# Patient Record
Sex: MALE | Employment: UNEMPLOYED | URBAN - METROPOLITAN AREA
[De-identification: names, ages, dates, MRNs, and addresses within clinical notes are randomized per-mention and may not be internally consistent; named-entity substitution may affect disease eponyms.]

---

## 2017-10-25 ENCOUNTER — ANESTHESIA EVENT (OUTPATIENT)
Dept: ENDOSCOPY | Age: 62
End: 2017-10-25
Payer: MEDICARE

## 2017-10-25 RX ORDER — HALOPERIDOL 0.5 MG/1
TABLET ORAL
Status: ON HOLD | COMMUNITY
Start: 2017-09-28 | End: 2019-05-29 | Stop reason: DRUGHIGH

## 2017-10-25 RX ORDER — LORAZEPAM 0.5 MG/1
0.5 TABLET ORAL
Status: ON HOLD | COMMUNITY
Start: 2017-09-28 | End: 2019-05-29

## 2017-10-25 RX ORDER — LANOLIN ALCOHOL/MO/W.PET/CERES
6 CREAM (GRAM) TOPICAL
COMMUNITY
Start: 2017-08-24

## 2017-10-25 RX ORDER — ACETAMINOPHEN 325 MG/1
325 TABLET ORAL
COMMUNITY
Start: 2017-08-02 | End: 2019-06-05 | Stop reason: ALTCHOICE

## 2017-10-25 RX ORDER — TROLAMINE SALICYLATE 10 G/100G
CREAM TOPICAL 3 TIMES DAILY
COMMUNITY
Start: 2017-10-20 | End: 2019-05-28

## 2017-10-25 RX ORDER — MELATONIN
1000 DAILY
COMMUNITY
Start: 2017-08-02

## 2017-10-26 ENCOUNTER — HOSPITAL ENCOUNTER (OUTPATIENT)
Age: 62
Setting detail: OUTPATIENT SURGERY
Discharge: HOME | End: 2017-10-26
Attending: INTERNAL MEDICINE | Admitting: INTERNAL MEDICINE
Payer: MEDICARE

## 2017-10-26 ENCOUNTER — ANESTHESIA (OUTPATIENT)
Dept: ENDOSCOPY | Age: 62
End: 2017-10-26
Payer: MEDICARE

## 2017-10-26 VITALS
WEIGHT: 136 LBS | HEIGHT: 68 IN | OXYGEN SATURATION: 99 % | BODY MASS INDEX: 20.61 KG/M2 | HEART RATE: 72 BPM | RESPIRATION RATE: 16 BRPM | DIASTOLIC BLOOD PRESSURE: 72 MMHG | TEMPERATURE: 97 F | SYSTOLIC BLOOD PRESSURE: 124 MMHG

## 2017-10-26 PROBLEM — K21.9 GASTROESOPHAGEAL REFLUX DISEASE WITHOUT ESOPHAGITIS: Chronic | Status: ACTIVE | Noted: 2017-10-26

## 2017-10-26 PROCEDURE — 74011000250 HC RX REV CODE- 250

## 2017-10-26 PROCEDURE — 76040000019: Performed by: INTERNAL MEDICINE

## 2017-10-26 PROCEDURE — 77030009426 HC FCPS BIOP ENDOSC BSC -B: Performed by: INTERNAL MEDICINE

## 2017-10-26 PROCEDURE — 77030031670 HC DEV INFL ENDOTEK BIG60 MRTM -B: Performed by: INTERNAL MEDICINE

## 2017-10-26 PROCEDURE — 88305 TISSUE EXAM BY PATHOLOGIST: CPT | Performed by: INTERNAL MEDICINE

## 2017-10-26 PROCEDURE — 74011250636 HC RX REV CODE- 250/636: Performed by: NURSE ANESTHETIST, CERTIFIED REGISTERED

## 2017-10-26 PROCEDURE — 74011250636 HC RX REV CODE- 250/636

## 2017-10-26 PROCEDURE — 76060000031 HC ANESTHESIA FIRST 0.5 HR: Performed by: INTERNAL MEDICINE

## 2017-10-26 RX ORDER — SODIUM CHLORIDE 9 MG/ML
25 INJECTION, SOLUTION INTRAVENOUS CONTINUOUS
Status: DISCONTINUED | OUTPATIENT
Start: 2017-10-26 | End: 2017-10-26 | Stop reason: HOSPADM

## 2017-10-26 RX ORDER — SODIUM CHLORIDE 0.9 % (FLUSH) 0.9 %
5-10 SYRINGE (ML) INJECTION EVERY 8 HOURS
Status: DISCONTINUED | OUTPATIENT
Start: 2017-10-26 | End: 2017-10-26 | Stop reason: HOSPADM

## 2017-10-26 RX ORDER — SODIUM CHLORIDE 0.9 % (FLUSH) 0.9 %
5-10 SYRINGE (ML) INJECTION AS NEEDED
Status: DISCONTINUED | OUTPATIENT
Start: 2017-10-26 | End: 2017-10-26 | Stop reason: HOSPADM

## 2017-10-26 RX ORDER — LIDOCAINE HYDROCHLORIDE 20 MG/ML
INJECTION, SOLUTION EPIDURAL; INFILTRATION; INTRACAUDAL; PERINEURAL AS NEEDED
Status: DISCONTINUED | OUTPATIENT
Start: 2017-10-26 | End: 2017-10-26 | Stop reason: HOSPADM

## 2017-10-26 RX ORDER — FAMOTIDINE 20 MG/1
20 TABLET, FILM COATED ORAL ONCE
Status: DISCONTINUED | OUTPATIENT
Start: 2017-10-26 | End: 2017-10-26 | Stop reason: HOSPADM

## 2017-10-26 RX ORDER — SODIUM CHLORIDE, SODIUM LACTATE, POTASSIUM CHLORIDE, CALCIUM CHLORIDE 600; 310; 30; 20 MG/100ML; MG/100ML; MG/100ML; MG/100ML
50 INJECTION, SOLUTION INTRAVENOUS CONTINUOUS
Status: DISCONTINUED | OUTPATIENT
Start: 2017-10-26 | End: 2017-10-26 | Stop reason: HOSPADM

## 2017-10-26 RX ORDER — PROPOFOL 10 MG/ML
INJECTION, EMULSION INTRAVENOUS AS NEEDED
Status: DISCONTINUED | OUTPATIENT
Start: 2017-10-26 | End: 2017-10-26 | Stop reason: HOSPADM

## 2017-10-26 RX ORDER — LIDOCAINE HYDROCHLORIDE 10 MG/ML
0.1 INJECTION, SOLUTION EPIDURAL; INFILTRATION; INTRACAUDAL; PERINEURAL AS NEEDED
Status: DISCONTINUED | OUTPATIENT
Start: 2017-10-26 | End: 2017-10-26 | Stop reason: HOSPADM

## 2017-10-26 RX ORDER — ONDANSETRON 2 MG/ML
4 INJECTION INTRAMUSCULAR; INTRAVENOUS
Status: DISCONTINUED | OUTPATIENT
Start: 2017-10-26 | End: 2017-10-26 | Stop reason: HOSPADM

## 2017-10-26 RX ORDER — SODIUM CHLORIDE, SODIUM LACTATE, POTASSIUM CHLORIDE, CALCIUM CHLORIDE 600; 310; 30; 20 MG/100ML; MG/100ML; MG/100ML; MG/100ML
25 INJECTION, SOLUTION INTRAVENOUS CONTINUOUS
Status: DISCONTINUED | OUTPATIENT
Start: 2017-10-26 | End: 2017-10-26 | Stop reason: HOSPADM

## 2017-10-26 RX ADMIN — PROPOFOL 50 MG: 10 INJECTION, EMULSION INTRAVENOUS at 09:54

## 2017-10-26 RX ADMIN — PROPOFOL 30 MG: 10 INJECTION, EMULSION INTRAVENOUS at 09:56

## 2017-10-26 RX ADMIN — SODIUM CHLORIDE, SODIUM LACTATE, POTASSIUM CHLORIDE, AND CALCIUM CHLORIDE 25 ML/HR: 600; 310; 30; 20 INJECTION, SOLUTION INTRAVENOUS at 09:13

## 2017-10-26 RX ADMIN — LIDOCAINE HYDROCHLORIDE 40 MG: 20 INJECTION, SOLUTION EPIDURAL; INFILTRATION; INTRACAUDAL; PERINEURAL at 09:53

## 2017-10-26 NOTE — PROCEDURES
EGD Procedure Note    Patient: Reg Montenegro MRN: [id number]  SSN: [id number]    YOB: 1955  Age: 60 y.o. Sex: male      Date of Procedure: 10/26/2017      Procedures:  EGD :    HISTORY UPDATE: History and physical has been reviewed. The patient has been examined. There have been no significant clinical changes since the completion of the originally dated History and Physical.    INDICATION:  GERD    PROCEDURE PERFORMED: EGD    ENDOSCOPIST: Jose Mathews MD    ASSISTANT:  Endoscopy Technician-1: Paty Cardoza  Endoscopy RN-1: Carlos Vargas RN    CLASSIFICATION OF PREOPERATIVE RISK: ASA 2 - Patient with mild systemic disease with no functional limitations    ANESTHESIA:  MAC anesthesia    ENDOSCOPE: [id number]                                                                                                              EXTENT OF EXAM: Second portion of the duodenum      DESCRIPTION OF PROCEDURE:   The procedure was discussed with the patient including purpose, risks, benefits and alternatives including but not limited to IV conscious sedation, bleeding, perforation and aspiration and the consent form was signed and witnessed. A safety timeout was performed. Procedure done with MAC. The patients vital signs were monitored at all times including heart rate and rhythm, oxygen saturation, and blood pressure. The patient was then placed into the left lateral decubitus position. The Olympus adult diagnostic endoscope was then passed under direct visualization to the second portion of the duodenum. The endoscope was then slowly withdrawn while closely visualizing the mucosa. In the stomach a retroflexion was performed and gastric fundus and cardia visualized. The scope was then removed. The patient was then transferred to the recovery room. FINDINGS:   Esophagus: The esophageal mucosa was normal with no ulceration, mass or stricture.   There was no evidence of Acevedo's esophagus or reflux esophagitis. A schatzki's ring seen at the GE junction at 38 cm, dilated with 18-20 mm TTS balloon. A 2 cm Hiatal hernia seen from 38 to 40 cm. Stomach: The gastric mucosa was showed no ulceration, mass, stricture. Mild gastritis in the antrum, biopsies done to r/o H. Pylori. Retroflexion revealed a normal cardia and fundus      Duodenum: The duodenum mucosa was normal with no ulceration, mass,  stricture and no evidence of villous atrophy. EBL: None      SPECIMENS:   ID Type Source Tests Collected by Time Destination   1 : bx gastric for hpylori Preservative Gastric  Malika Shaw MD 10/26/2017 1000 Pathology       IMPRESSION: A schatzki's ring seen at the GE junction at 38 cm, dilated with 18-20 mm TTS balloon. A 2 cm Hiatal hernia seen from 38 to 40 cm. PLAN 1: Discharge when sedation criteria are met. 2.  Resume regular Diet as tolerated. 3. Follow up on the biopsy results     Follow Up:  As scheduled.      Kathe Luna MD  10/26/2017

## 2017-10-26 NOTE — H&P
Date of Surgery Update:  Gerhardt Hard was seen and examined. History and physical has been reviewed. The patient has been examined.  There have been no significant clinical changes since the completion of the originally dated History and Physical.    Signed By: Torri Perales MD     October 26, 2017 9:29 AM

## 2017-10-26 NOTE — ANESTHESIA PREPROCEDURE EVALUATION
Anesthetic History   No history of anesthetic complications            Review of Systems / Medical History  Patient summary reviewed and pertinent labs reviewed    Pulmonary  Within defined limits                 Neuro/Psych         Dementia     Cardiovascular  Within defined limits                Exercise tolerance: >4 METS     GI/Hepatic/Renal     GERD: poorly controlled      PUD     Endo/Other  Within defined limits           Other Findings   Comments:   Risk Factors for Postoperative nausea/vomiting:       History of postoperative nausea/vomiting? NO       Female? NO       Motion sickness? NO       Intended opioid administration for postoperative analgesia? NO      Smoking Abstinence  Current Smoker? NO  Elective Surgery? YES  Seen preoperatively by anesthesiologist or proxy prior to day of surgery? YES  Pt abstained from smoking 24 hours prior to anesthesia?  N/A           Physical Exam    Airway  Mallampati: II  TM Distance: 4 - 6 cm  Neck ROM: normal range of motion   Mouth opening: Normal     Cardiovascular  Regular rate and rhythm,  S1 and S2 normal,  no murmur, click, rub, or gallop  Rhythm: regular  Rate: normal         Dental    Dentition: Full lower dentures and Full upper dentures     Pulmonary  Breath sounds clear to auscultation               Abdominal  GI exam deferred       Other Findings            Anesthetic Plan    ASA: 3  Anesthesia type: MAC          Induction: Intravenous  Anesthetic plan and risks discussed with: Patient and Son / Daughter

## 2017-10-26 NOTE — IP AVS SNAPSHOT
[facility] 
 
 
 [address] 
[phone] Patient: Eric Younger MRN: [id number] GDU:[id number]/30/[id number] About your hospitalization You were admitted on:  October 26, 2017 You last received care in the:  [facility] PHASE 2 RECOVERY You were discharged on:  October 26, 2017 Why you were hospitalized Your primary diagnosis was:  Not on File Your diagnoses also included:  Gastroesophageal Reflux Disease Without Esophagitis Things You Need To Do (next 8 weeks) Follow up with Bari Roldan MD in 6 week(s) Phone:  [phone] Where:  [address], [facility] [address], [facility] Follow up with Kaia Mahmood MD  
  
Where:  Patient can only remember the practice name and not the physician Discharge Orders None A check charli indicates which time of day the medication should be taken. My Medications TAKE these medications as instructed Instructions Each Dose to Equal  
 Morning Noon Evening Bedtime  
 acetaminophen 325 mg tablet Commonly known as:  TYLENOL Your last dose was: Your next dose is: Take 325 mg by mouth every six (6) hours as needed. 325 mg  
    
   
   
   
  
 cetaphil topical cream  
Commonly known as:  CETAPHIL Your last dose was: Your next dose is:    
   
   
 Apply  to affected area daily as needed. cholecalciferol 1,000 unit tablet Commonly known as:  VITAMIN D3 Your last dose was: Your next dose is: Take 1,000 Units by mouth daily. 1000 Units  
    
   
   
   
  
 haloperidol 0.5 mg tablet Commonly known as:  HALDOL Your last dose was: Your next dose is: Take 1 mg by mouth nightly. 1 mg LORazepam 0.5 mg tablet Commonly known as:  ATIVAN Your last dose was: Your next dose is: Take 0.5 mg by mouth every eight (8) hours as needed. 0.5 mg  
    
   
   
   
  
 melatonin 3 mg tablet Your last dose was: Your next dose is: Take 3 mg by mouth nightly. 3 mg  
    
   
   
   
  
 trolamine salicylate 10 % topical cream  
Commonly known as:  MYOFLEX Your last dose was: Your next dose is:    
   
   
 Apply  to affected area three (3) times daily. Discharge Instructions Upper GI Endoscopy: What to Expect at [facility] Your Recovery After you have an endoscopy, you will stay at the hospital or clinic for 1 to 2 hours. This will allow the medicine to wear off. You will be able to go home after your doctor or nurse checks to make sure you are not having any problems. You may have to stay overnight if you had treatment during the test. You may have a sore throat for a day or two after the test. 
This care sheet gives you a general idea about what to expect after the test. 
How can you care for yourself at home? Activity · Rest as much as you need to after you go home. · You should be able to go back to your usual activities the day after the test. 
Diet · Follow your doctor's directions for eating after the test. 
· Drink plenty of fluids (unless your doctor has told you not to). Medications · If you have a sore throat the day after the test, use an over-the-counter spray to numb your throat. Follow-up care is a key part of your treatment and safety. Be sure to make and go to all appointments, and call your doctor if you are having problems. It's also a good idea to know your test results and keep a list of the medicines you take. When should you call for help? Call 911 anytime you think you may need emergency care. For example, call if: 
? · You passed out (loses consciousness). ? · You have trouble breathing. ? · You pass maroon or bloody stools. ?Call your doctor now or seek immediate medical care if: 
? · You have pain that does not get better after your take pain medicine. ? · You have new or worse belly pain. ? · You have blood in your stools. ? · You are sick to your stomach and cannot keep fluids down. ? · You have a fever. ? · You cannot pass stools or gas. ? Watch closely for changes in your health, and be sure to contact your doctor if: 
? · Your throat still hurts after a day or two. ? · You do not get better as expected. Where can you learn more? Go to http://avelino-luis.info/. Enter ([phone] in the search box to learn more about \"Upper GI Endoscopy: What to Expect at Home. \" Current as of: May 12, 2017 Content Version: 11.4 © 9624-0480 KPA. Care instructions adapted under license by [employer] (which disclaims liability or warranty for this information). If you have questions about a medical condition or this instruction, always ask your healthcare professional. [place] [address] any warranty or liability for your use of this information. Patient discharged without removing armband and transfered to another healthcare acute, sub acute , or extended care facility. Informed of privacy risks if armband lost or stolen Introducing [facility]! Debbie Bell introduces [employer]Spot[employer] patient portal. Now you can access parts of your medical record, email your doctor's office, and request medication refills online. 1. In your internet browser, go to [email or web address] 2. Click on the First Time User? Click Here link in the Sign In box. You will see the New Member Sign Up page. 3. Enter your CareSpotter Access Code exactly as it appears below. You will not need to use this code after youve completed the sign-up process.  If you do not sign up before the expiration date, you must request a new code. · [employer] Access Code: [email or web address] Expires: 1/24/2018  8:05 AM 
 
4. Enter the last four digits of your Social Security Number (xxxx) and Date of Birth (mm/dd/yyyy) as indicated and click Submit. You will be taken to the next sign-up page. 5. Create a [employer] ID. This will be your [employer] login ID and cannot be changed, so think of one that is secure and easy to remember. 6. Create a [employer] password. You can change your password at any time. 7. Enter your Password Reset Question and Answer. This can be used at a later time if you forget your password. 8. Enter your e-mail address. You will receive e-mail notification when new information is available in [address] 9. Click Sign Up. You can now view and download portions of your medical record. 10. Click the Download Summary menu link to download a portable copy of your medical information. If you have questions, please visit the Frequently Asked Questions section of the [employer] website. Remember, [employer] is NOT to be used for urgent needs. For medical emergencies, dial 911. Now available from your iPhone and Android! Providers Seen During Your Hospitalization Provider Specialty Primary office phone Cory Wolf MD Gastroenterology [phone] Your Primary Care Physician (PCP) Primary Care Physician Office Phone Office Fax OTHER, PHYS ** None ** ** None ** You are allergic to the following No active allergies Recent Documentation Height Weight BMI Smoking Status 1.727 m 61.7 kg 20.68 kg/m2 Former Smoker Emergency Contacts Name Discharge Info Relation Home Work Mobile Juliana Durham DISCHARGE CAREGIVER [3] Daughter [21]   [phone] Patient Belongings The following personal items are in your possession at time of discharge: Dental Appliances: Lowers, Uppers  Visual Aid: None Please provide this summary of care documentation to your next provider. Signatures-by signing, you are acknowledging that this After Visit Summary has been reviewed with you and you have received a copy. Patient Signature:  ____________________________________________________________ Date:  ____________________________________________________________  
  
Marika Alton Provider Signature:  ____________________________________________________________ Date:  ____________________________________________________________

## 2017-10-26 NOTE — DISCHARGE INSTRUCTIONS
Upper GI Endoscopy: What to Expect at [address] [place] [address]  After you have an endoscopy, you will stay at the hospital or clinic for 1 to 2 hours. This will allow the medicine to wear off. You will be able to go home after your doctor or nurse checks to make sure you are not having any problems. You may have to stay overnight if you had treatment during the test. You may have a sore throat for a day or two after the test.  This care sheet gives you a general idea about what to expect after the test.  How can you care for yourself at home? Activity  · Rest as much as you need to after you go home. · You should be able to go back to your usual activities the day after the test.  Diet  · Follow your doctor's directions for eating after the test.  · Drink plenty of fluids (unless your doctor has told you not to). Medications  · If you have a sore throat the day after the test, use an over-the-counter spray to numb your throat. Follow-up care is a key part of your treatment and safety. Be sure to make and go to all appointments, and call your doctor if you are having problems. It's also a good idea to know your test results and keep a list of the medicines you take. When should you call for help? Call 911 anytime you think you may need emergency care. For example, call if:  ? · You passed out (loses consciousness). ? · You have trouble breathing. ? · You pass maroon or bloody stools. ?Call your doctor now or seek immediate medical care if:  ? · You have pain that does not get better after your take pain medicine. ? · You have new or worse belly pain. ? · You have blood in your stools. ? · You are sick to your stomach and cannot keep fluids down. ? · You have a fever. ? · You cannot pass stools or gas. ? Watch closely for changes in your health, and be sure to contact your doctor if:  ? · Your throat still hurts after a day or two. ? · You do not get better as expected. Where can you learn more? Go to http://avelino-luis.info/. Enter ([phone] in the search box to learn more about \"Upper GI Endoscopy: What to Expect at Home. \"  Current as of: May 12, 2017  Content Version: 11.4  © 4343-3362 Recommendi. Care instructions adapted under license by [employer] (which disclaims liability or warranty for this information). If you have questions about a medical condition or this instruction, always ask your healthcare professional. [place] [address] any warranty or liability for your use of this information. Patient discharged without removing armband and transfered to another healthcare acute, sub acute , or extended care facility.   Informed of privacy risks if armband lost or stolen

## 2017-10-26 NOTE — ANESTHESIA POSTPROCEDURE EVALUATION
Post-Anesthesia Evaluation & Assessment    Visit Vitals    /72    Pulse 72    Temp 36.1 °C (97 °F)    Resp 16    Ht 5' 8\" (1.727 m)    Wt 61.7 kg (136 lb)    SpO2 99%    BMI 20.68 kg/m2       Nausea/Vomiting: no nausea    Post-operative hydration adequate.     Pain score (VAS): 0    Mental status & Level of consciousness: alert and oriented x 3    Neurological status: moves all extremities, sensation grossly intact    Pulmonary status: airway patent, no supplemental oxygen required    Complications related to anesthesia: none    Additional comments:

## 2018-05-15 ENCOUNTER — HOSPITAL ENCOUNTER (EMERGENCY)
Age: 63
Discharge: OTHER INSTITUTION | End: 2018-05-16
Attending: EMERGENCY MEDICINE
Payer: MEDICARE

## 2018-05-15 DIAGNOSIS — F01.50 VASCULAR DEMENTIA WITHOUT BEHAVIORAL DISTURBANCE (HCC): Primary | ICD-10-CM

## 2018-05-15 DIAGNOSIS — F20.3 UNDIFFERENTIATED SCHIZOPHRENIA (HCC): ICD-10-CM

## 2018-05-15 PROCEDURE — 99282 EMERGENCY DEPT VISIT SF MDM: CPT

## 2018-05-16 ENCOUNTER — APPOINTMENT (OUTPATIENT)
Dept: GENERAL RADIOLOGY | Age: 63
End: 2018-05-16
Attending: EMERGENCY MEDICINE
Payer: MEDICARE

## 2018-05-16 VITALS
OXYGEN SATURATION: 98 % | SYSTOLIC BLOOD PRESSURE: 136 MMHG | TEMPERATURE: 98.2 F | RESPIRATION RATE: 14 BRPM | HEART RATE: 72 BPM | DIASTOLIC BLOOD PRESSURE: 82 MMHG

## 2018-05-16 LAB
ALBUMIN SERPL-MCNC: 3.3 G/DL (ref 3.4–5)
ALBUMIN/GLOB SERPL: 0.8 {RATIO} (ref 0.8–1.7)
ALP SERPL-CCNC: 82 U/L (ref 45–117)
ALT SERPL-CCNC: 22 U/L (ref 16–61)
AMPHET UR QL SCN: NEGATIVE
ANION GAP SERPL CALC-SCNC: 8 MMOL/L (ref 3–18)
APAP SERPL-MCNC: <2 UG/ML (ref 10–30)
APPEARANCE UR: CLEAR
AST SERPL-CCNC: 17 U/L (ref 15–37)
BARBITURATES UR QL SCN: NEGATIVE
BASOPHILS # BLD: 0 K/UL (ref 0–0.1)
BASOPHILS NFR BLD: 0 % (ref 0–2)
BENZODIAZ UR QL: NEGATIVE
BILIRUB SERPL-MCNC: 0.3 MG/DL (ref 0.2–1)
BILIRUB UR QL: NEGATIVE
BUN SERPL-MCNC: 15 MG/DL (ref 7–18)
BUN/CREAT SERPL: 16 (ref 12–20)
CALCIUM SERPL-MCNC: 8.8 MG/DL (ref 8.5–10.1)
CANNABINOIDS UR QL SCN: NEGATIVE
CHLORIDE SERPL-SCNC: 103 MMOL/L (ref 100–108)
CK MB CFR SERPL CALC: NORMAL % (ref 0–4)
CK MB SERPL-MCNC: <1 NG/ML (ref 5–25)
CK SERPL-CCNC: 68 U/L (ref 39–308)
CO2 SERPL-SCNC: 27 MMOL/L (ref 21–32)
COCAINE UR QL SCN: NEGATIVE
COLOR UR: YELLOW
CREAT SERPL-MCNC: 0.96 MG/DL (ref 0.6–1.3)
DIFFERENTIAL METHOD BLD: ABNORMAL
EOSINOPHIL # BLD: 0.3 K/UL (ref 0–0.4)
EOSINOPHIL NFR BLD: 3 % (ref 0–5)
ERYTHROCYTE [DISTWIDTH] IN BLOOD BY AUTOMATED COUNT: 12.3 % (ref 11.6–14.5)
ETHANOL SERPL-MCNC: <3 MG/DL (ref 0–3)
GLOBULIN SER CALC-MCNC: 4.3 G/DL (ref 2–4)
GLUCOSE SERPL-MCNC: 103 MG/DL (ref 74–99)
GLUCOSE UR STRIP.AUTO-MCNC: NEGATIVE MG/DL
HCT VFR BLD AUTO: 40.5 % (ref 36–48)
HDSCOM,HDSCOM: NORMAL
HGB BLD-MCNC: 14 G/DL (ref 13–16)
HGB UR QL STRIP: NEGATIVE
KETONES UR QL STRIP.AUTO: NEGATIVE MG/DL
LEUKOCYTE ESTERASE UR QL STRIP.AUTO: NEGATIVE
LYMPHOCYTES # BLD: 2.2 K/UL (ref 0.9–3.6)
LYMPHOCYTES NFR BLD: 23 % (ref 21–52)
MAGNESIUM SERPL-MCNC: 2.2 MG/DL (ref 1.6–2.6)
MCH RBC QN AUTO: 30.9 PG (ref 24–34)
MCHC RBC AUTO-ENTMCNC: 34.6 G/DL (ref 31–37)
MCV RBC AUTO: 89.4 FL (ref 74–97)
METHADONE UR QL: NEGATIVE
MONOCYTES # BLD: 0.8 K/UL (ref 0.05–1.2)
MONOCYTES NFR BLD: 8 % (ref 3–10)
NEUTS SEG # BLD: 6.4 K/UL (ref 1.8–8)
NEUTS SEG NFR BLD: 66 % (ref 40–73)
NITRITE UR QL STRIP.AUTO: NEGATIVE
OPIATES UR QL: NEGATIVE
PCP UR QL: NEGATIVE
PH UR STRIP: 8.5 [PH] (ref 5–8)
PLATELET # BLD AUTO: 333 K/UL (ref 135–420)
PMV BLD AUTO: 10 FL (ref 9.2–11.8)
POTASSIUM SERPL-SCNC: 3.7 MMOL/L (ref 3.5–5.5)
PROT SERPL-MCNC: 7.6 G/DL (ref 6.4–8.2)
PROT UR STRIP-MCNC: NEGATIVE MG/DL
RBC # BLD AUTO: 4.53 M/UL (ref 4.7–5.5)
SALICYLATES SERPL-MCNC: <2.8 MG/DL (ref 2.8–20)
SODIUM SERPL-SCNC: 138 MMOL/L (ref 136–145)
SP GR UR REFRACTOMETRY: 1.01 (ref 1–1.03)
TROPONIN I SERPL-MCNC: <0.02 NG/ML (ref 0–0.04)
UROBILINOGEN UR QL STRIP.AUTO: 0.2 EU/DL (ref 0.2–1)
WBC # BLD AUTO: 9.7 K/UL (ref 4.6–13.2)

## 2018-05-16 PROCEDURE — 93005 ELECTROCARDIOGRAM TRACING: CPT

## 2018-05-16 PROCEDURE — 82550 ASSAY OF CK (CPK): CPT | Performed by: PHYSICIAN ASSISTANT

## 2018-05-16 PROCEDURE — 71045 X-RAY EXAM CHEST 1 VIEW: CPT

## 2018-05-16 PROCEDURE — 80307 DRUG TEST PRSMV CHEM ANLYZR: CPT | Performed by: PHYSICIAN ASSISTANT

## 2018-05-16 PROCEDURE — 80053 COMPREHEN METABOLIC PANEL: CPT | Performed by: PHYSICIAN ASSISTANT

## 2018-05-16 PROCEDURE — 85025 COMPLETE CBC W/AUTO DIFF WBC: CPT | Performed by: PHYSICIAN ASSISTANT

## 2018-05-16 PROCEDURE — 81003 URINALYSIS AUTO W/O SCOPE: CPT | Performed by: PHYSICIAN ASSISTANT

## 2018-05-16 PROCEDURE — 83735 ASSAY OF MAGNESIUM: CPT | Performed by: PHYSICIAN ASSISTANT

## 2018-05-16 NOTE — ED NOTES
Incontinent of urine. Patient cleaned, linens changed, gown applied, then repositioned on stretcher to comfort. Awaiting placement at [facility] after chest X-ray. Will continue to monitor.

## 2018-05-16 NOTE — ED NOTES
Resting on stretcher with eyes closed. Respirations regular and unlabored. Will continue to monitor.

## 2018-05-16 NOTE — PROGRESS NOTES
This writer called Dayan, who was able to verify this pt is active with the [facility]. Coordinator, writes this living situation is an ongoing problematic situation, when the pt is in the home and family normally responds well to a period of respite  This writer is at pt's beside, pt is at this time staring in space, in room # 6 alone and not responding. This writer called pt's daughter Juliana DurhamNwLchstpo-[phone]. Daughter requested placement at [facility]. This writer called [facility] who are willing to approve admission. [facility] refused this facility citing that daughter will need come and speak with  is she needing long term care, due to this most recent incident. This writer was advised by Coordinator to place this pt at [facility] or [facility] for respite only. This was relayed to the pt's daughter who is agreeable with this plan reluctantly. This writer will place this pt in e-discharge along with a call to the facility to report the emergency nature of this placement. This writer spoke with this pt's daughter regarding the decision by [facility], and informed the daughter of the choices, pt's daughter chose [facility] regarding this respite admission. facility requested a T.B test or chest X-Ray this request was given to the EM MD. For this to be completed. This pt was accepted by [place], [place]. Sabina CROCKER will provide pay for this pt to be at this facility. Yo Levy Coordinator was notified of this acceptance as well as this pt's daughter Isai Jan [phone]. Called for assistance with transport, not available, this writer will make travel arrangements and provide the needed  number to call for report to [facility] of [address] & [facility], [facility], [phone].  Pt will transferred by [facility] transportation services. At 5:00 p.mJairo Cerrato has authorized a two week stay for this pt.

## 2018-05-16 NOTE — ED TRIAGE NOTES
Pt. Pankaj Valiente in by [facility], per officer pt.'s family called stating pt. Was outside today and peed on his 3 y.o. Grandson. Family wants him to have a mental evaluation. Hx of Dementia.

## 2018-05-16 NOTE — ED NOTES
turend over to me 7am penidng placement. General; AOX3. Pulmonary; CTA-B. Cardiac: RRR no MRG; Abd S/NT/ND. Pt awake; not speakring. Pleasantly looking aroudn room. Seen by case management. xr requested and obtained.      4:11 PM pt has been placed will d/ c

## 2018-05-16 NOTE — ED NOTES
Bedside shift report received from Saniya Sweeney RN. Assumed care of the patient. Received patient resting on stretcher, awake, alert, oriented to self only. Denies any complaints at this time. Awaiting evaluation from [facility] Will continue to monitor.

## 2018-05-16 NOTE — DISCHARGE INSTRUCTIONS
Dementia: Care Instructions  Your Care Instructions    Dementia is a loss of mental skills that affects your daily life. It is different than the occasional trouble with memory that is part of aging. You may find it hard to remember things that you feel you should be able to remember. Or you may feel that your mind is just not working as well as usual.  Finding out that you have dementia is a shock. You may be afraid and worried about how the condition will change your life. Although there is no cure at this time, medicine may slow memory loss and improve thinking for a while. Other medicines may be able to help you sleep or cope with depression and behavior changes. Dementia often gets worse slowly. But it can get worse quickly. As dementia gets worse, it may become harder to do common things that take planning, like making a list and going shopping. Over time, the disease may make it hard for you to take care of yourself. Some people with dementia need others to help care for them. Dementia is different for everyone. You may be able to function well for a long time. In the early stage of the condition, you can do things at home to make life easier and safer. You also can keep doing your hobbies and other activities. Many people find comfort in planning now for their future needs. Follow-up care is a key part of your treatment and safety. Be sure to make and go to all appointments, and call your doctor if you are having problems. It's also a good idea to know your test results and keep a list of the medicines you take. How can you care for yourself at home? · Take your medicines exactly as prescribed. Call your doctor if you think you are having a problem with your medicine. · Eat healthy foods. Eat lots of whole grains, fruits, and vegetables every day.  If you are not hungry, try snacks or nutritional drinks such as Boost, Ensure, or Sustacal.  · If you have problems sleeping:  ¨ Try not to nap too close to your bedtime. ¨ Exercise regularly. Walking is a good choice. ¨ Try a glass of warm milk or caffeine-free herbal tea before bed. · Do tasks and activities during the time of day when you feel your best. It may help to develop a daily routine. · Post labels, lists, and sticky notes to help you remember things. Write your activities on a calendar you can easily find. Put your clock where you can easily see it. · Stay active. Take walks in familiar places, or with friends or loved ones. Try to stay active mentally too. Read and work crossword puzzles if you enjoy these activities. · Do not drive unless you can pass an on-road driving test. If you are not sure if you are safe to drive, your state 's license bureau can test you. · Keep a cordless phone and a flashlight with new batteries by your bed. If possible, put a phone in each of the main rooms of your house, or carry a cell phone in case you fall and cannot reach a phone. Or, you can wear a device around your neck or wrist. You push a button that sends a signal for help. Acknowledge your emotions and plan for the future  · Talk openly and honestly with your doctor. · Let yourself grieve. It is common to feel angry, scared, frustrated, anxious, or depressed. · Get emotional support from family, friends, a support group, or a counselor experienced in working with people who have dementia. · Ask for help if you need it. · Plan for the future. ¨ Talk to your family and doctor about preparing a living will and other important papers while you can make decisions. These papers tell your doctors how to care for you at the end of your life. ¨ Consider naming a person to make decisions about your care if you are not able to. When should you call for help? Call 911 anytime you think you may need emergency care. For example, call if:  ? · You are lost and do not know whom to call. ? · You are injured and do not know whom to call.    ?Call your doctor now or seek immediate medical care if:  ? · You are more confused or upset than usual.   ? · You feel like you could hurt yourself because your mind is not working well. ? Watch closely for changes in your health, and be sure to contact your doctor if you have any problems. Where can you learn more? Go to http://avelino-luis.info/. Enter O861 in the search box to learn more about \"Dementia: Care Instructions. \"  Current as of: May 12, 2017  Content Version: 11.4  © 7000-6438 Healthwise[employer]. Care instructions adapted under license by [employer] (which disclaims liability or warranty for this information). If you have questions about a medical condition or this instruction, always ask your healthcare professional. [address] any warranty or liability for your use of this information.

## 2018-05-16 NOTE — ED PROVIDER NOTES
EMERGENCY DEPARTMENT HISTORY AND PHYSICAL EXAM    1:43 AM      Date: 5/15/2018  Patient Name: Jayden Auguste    History of Presenting Illness     Chief Complaint   Patient presents with    Mental Health Problem         History Provided By: Christopher MATOS and pt's daughter     Chief Complaint: dementia, mental health      Additional History (Context): Jayden Auguste is a 60 y.o. male with PMH of dementia, GERD, PUD, and schizoaffective disorder who is brought to the ED by Celestino MATOS after he reportedly urinated on his 3 yr old grandson at home. I have spoken with the pt's daughter whom he lives with. Pt attend [facility]  during the day but has become increasingly difficult to take care of. Pt's daughter states she cannot continue his care at her home any longer and would like him to be evaluated for possible nursing home placement. No other complaints were reported. PCP: Kaia Mahmood MD    Current Outpatient Prescriptions   Medication Sig Dispense Refill    acetaminophen (TYLENOL) 325 mg tablet Take 325 mg by mouth every six (6) hours as needed.  cholecalciferol (VITAMIN D3) 1,000 unit tablet Take 1,000 Units by mouth daily.  haloperidol (HALDOL) 0.5 mg tablet Take 1 mg by mouth nightly.  LORazepam (ATIVAN) 0.5 mg tablet Take 0.5 mg by mouth every eight (8) hours as needed.  melatonin 3 mg tablet Take 3 mg by mouth nightly.  trolamine salicylate (MYOFLEX) 10 % topical cream Apply  to affected area three (3) times daily.  cetaphil (CETAPHIL) topical cream Apply  to affected area daily as needed.          Past History     Past Medical History:  Past Medical History:   Diagnosis Date    Dementia     GERD (gastroesophageal reflux disease)     PUD (peptic ulcer disease)     Schizoaffective disorder, depressive type ([facility]     Vitamin D deficiency        Past Surgical History:  Past Surgical History:   Procedure Laterality Date    ABDOMEN SURGERY PROC UNLISTED      Peptic ulcer surgery    HX APPENDECTOMY      HX KNEE ARTHROSCOPY      Both knees       Family History:  No family history on file. Social History:  Social History   Substance Use Topics    Smoking status: Former Smoker     Quit date: 10/25/2010    Smokeless tobacco: Never Used    Alcohol use No       Allergies:  No Known Allergies      Review of Systems       Review of Systems   Constitutional: Negative. Negative for chills and fever. HENT: Negative. Negative for congestion, ear pain and rhinorrhea. Eyes: Negative. Negative for discharge and redness. Respiratory: Negative. Negative for cough, shortness of breath, wheezing and stridor. Cardiovascular: Negative. Negative for chest pain and leg swelling. Gastrointestinal: Negative. Negative for abdominal pain, constipation, diarrhea, nausea and vomiting. Genitourinary: Negative. Negative for dysuria and frequency. Musculoskeletal: Negative. Negative for back pain and neck pain. Skin: Negative. Negative for rash and wound. Neurological: Negative for seizures, syncope and headaches. Dementia   Psychiatric/Behavioral: Positive for confusion. Dementia    All other systems reviewed and are negative. Physical Exam     Visit Vitals    BP (!) 136/91 (BP 1 Location: Left arm, BP Patient Position: Sitting)    Pulse 88    Temp 98.1 °F (36.7 °C)    Resp 16    SpO2 96%         Physical Exam   Constitutional: He appears well-developed and well-nourished. No distress. HENT:   Head: Normocephalic and atraumatic. Eyes: Conjunctivae are normal. Right eye exhibits no discharge. Left eye exhibits no discharge. No scleral icterus. Neck: Normal range of motion. Neck supple. Cardiovascular: Normal rate, regular rhythm and normal heart sounds. Exam reveals no gallop and no friction rub. No murmur heard. Pulmonary/Chest: Effort normal and breath sounds normal. No stridor. No respiratory distress. He has no wheezes.  He has no rales.   Musculoskeletal: Normal range of motion. Neurological: He is alert. Coordination normal.   Gait is steady. Able to ambulate without difficulty. Pt is alert but is only oriented to person. Skin: Skin is warm and dry. No rash noted. He is not diaphoretic. No erythema. Psychiatric:   Pt is demented, at baseline   Nursing note and vitals reviewed. Diagnostic Study Results     Labs -  RBC 4.53, glucose 103, ALB 3.3, GLOB 4.3,   Acetaminophen, salicylate, and alcohol unremarkable. Cardiac panel negative, EKG ordered   Radiologic Studies -   No orders to display         Medical Decision Making   I am the first provider for this patient. I reviewed the vital signs, available nursing notes, past medical history, past surgical history, family history and social history. Vital Signs-Reviewed the patient's vital signs. Records Reviewed:  (Time of Review: 1:43 AM)    ED Course: Progress Notes, Reevaluation, and Consults:      Provider Notes (Medical Decision Making): Impression:  Dementia   I have spoken with Crisis. This department does not handle demented pts. Will plan to consult case management. Ny Casas PA-C     4:05 AM Spoke with case management who will come and evaluate the pt for placement between 8:00-8:30 AM today. Pt is turned over to Dr. tipton agrees to assume care of this pt at this time. Discussed this case with the doctor who agrees with the plan to dispo the pt pending case management recommendation. Ny Casas PA-C     Diagnosis     Clinical Impression: dementia   Disposition: TBD     Follow-up Information     None           Patient's Medications   Start Taking    No medications on file   Continue Taking    ACETAMINOPHEN (TYLENOL) 325 MG TABLET    Take 325 mg by mouth every six (6) hours as needed. CETAPHIL (CETAPHIL) TOPICAL CREAM    Apply  to affected area daily as needed. CHOLECALCIFEROL (VITAMIN D3) 1,000 UNIT TABLET    Take 1,000 Units by mouth daily. HALOPERIDOL (HALDOL) 0.5 MG TABLET    Take 1 mg by mouth nightly. LORAZEPAM (ATIVAN) 0.5 MG TABLET    Take 0.5 mg by mouth every eight (8) hours as needed. MELATONIN 3 MG TABLET    Take 3 mg by mouth nightly. TROLAMINE SALICYLATE (MYOFLEX) 10 % TOPICAL CREAM    Apply  to affected area three (3) times daily.    These Medications have changed    No medications on file   Stop Taking    No medications on file     _______________________________    _______________________________

## 2018-05-17 LAB
ATRIAL RATE: 74 BPM
CALCULATED P AXIS, ECG09: 71 DEGREES
CALCULATED R AXIS, ECG10: 85 DEGREES
CALCULATED T AXIS, ECG11: 71 DEGREES
DIAGNOSIS, 93000: NORMAL
P-R INTERVAL, ECG05: 138 MS
Q-T INTERVAL, ECG07: 380 MS
QRS DURATION, ECG06: 94 MS
QTC CALCULATION (BEZET), ECG08: 421 MS
VENTRICULAR RATE, ECG03: 74 BPM

## 2019-01-01 ENCOUNTER — HOME CARE VISIT (OUTPATIENT)
Dept: SCHEDULING | Facility: HOME HEALTH | Age: 64
End: 2019-01-01
Payer: MEDICARE

## 2019-01-01 ENCOUNTER — HOME CARE VISIT (OUTPATIENT)
Dept: HOSPICE | Facility: HOSPICE | Age: 64
End: 2019-01-01
Payer: MEDICARE

## 2019-01-01 ENCOUNTER — HOSPITAL ENCOUNTER (OUTPATIENT)
Dept: LAB | Age: 64
Discharge: HOME | End: 2019-10-24
Payer: MEDICARE

## 2019-01-01 VITALS — OXYGEN SATURATION: 95 % | HEART RATE: 88 BPM

## 2019-01-01 VITALS
RESPIRATION RATE: 16 BRPM | DIASTOLIC BLOOD PRESSURE: 72 MMHG | SYSTOLIC BLOOD PRESSURE: 108 MMHG | TEMPERATURE: 96.4 F | OXYGEN SATURATION: 95 % | HEART RATE: 66 BPM

## 2019-01-01 VITALS
TEMPERATURE: 98.6 F | HEART RATE: 68 BPM | RESPIRATION RATE: 16 BRPM | SYSTOLIC BLOOD PRESSURE: 117 MMHG | OXYGEN SATURATION: 96 % | DIASTOLIC BLOOD PRESSURE: 64 MMHG

## 2019-01-01 VITALS
TEMPERATURE: 98.4 F | DIASTOLIC BLOOD PRESSURE: 66 MMHG | RESPIRATION RATE: 16 BRPM | HEART RATE: 78 BPM | OXYGEN SATURATION: 94 % | SYSTOLIC BLOOD PRESSURE: 106 MMHG

## 2019-01-01 VITALS
TEMPERATURE: 97 F | OXYGEN SATURATION: 94 % | WEIGHT: 91.6 LBS | DIASTOLIC BLOOD PRESSURE: 62 MMHG | BODY MASS INDEX: 14.35 KG/M2 | SYSTOLIC BLOOD PRESSURE: 108 MMHG | HEART RATE: 68 BPM | RESPIRATION RATE: 16 BRPM

## 2019-01-01 VITALS — OXYGEN SATURATION: 97 % | HEART RATE: 67 BPM | RESPIRATION RATE: 18 BRPM

## 2019-01-01 VITALS — OXYGEN SATURATION: 93 % | HEART RATE: 71 BPM | RESPIRATION RATE: 14 BRPM

## 2019-01-01 VITALS — RESPIRATION RATE: 16 BRPM | OXYGEN SATURATION: 90 % | HEART RATE: 63 BPM

## 2019-01-01 VITALS — RESPIRATION RATE: 16 BRPM | HEART RATE: 68 BPM | OXYGEN SATURATION: 92 %

## 2019-01-01 VITALS
OXYGEN SATURATION: 99 % | SYSTOLIC BLOOD PRESSURE: 104 MMHG | HEART RATE: 50 BPM | TEMPERATURE: 97.2 F | DIASTOLIC BLOOD PRESSURE: 60 MMHG

## 2019-01-01 VITALS
OXYGEN SATURATION: 94 % | TEMPERATURE: 97 F | RESPIRATION RATE: 16 BRPM | SYSTOLIC BLOOD PRESSURE: 122 MMHG | HEART RATE: 60 BPM | DIASTOLIC BLOOD PRESSURE: 74 MMHG

## 2019-01-01 VITALS — HEART RATE: 83 BPM | OXYGEN SATURATION: 94 % | RESPIRATION RATE: 20 BRPM

## 2019-01-01 VITALS
DIASTOLIC BLOOD PRESSURE: 66 MMHG | HEART RATE: 77 BPM | TEMPERATURE: 96.5 F | SYSTOLIC BLOOD PRESSURE: 124 MMHG | OXYGEN SATURATION: 95 %

## 2019-01-01 VITALS
HEART RATE: 78 BPM | RESPIRATION RATE: 16 BRPM | OXYGEN SATURATION: 92 % | SYSTOLIC BLOOD PRESSURE: 116 MMHG | DIASTOLIC BLOOD PRESSURE: 70 MMHG | TEMPERATURE: 98 F

## 2019-01-01 VITALS
RESPIRATION RATE: 18 BRPM | HEART RATE: 91 BPM | OXYGEN SATURATION: 96 % | TEMPERATURE: 97.2 F | SYSTOLIC BLOOD PRESSURE: 108 MMHG | DIASTOLIC BLOOD PRESSURE: 65 MMHG

## 2019-01-01 VITALS — OXYGEN SATURATION: 92 % | HEART RATE: 66 BPM | TEMPERATURE: 97.8 F

## 2019-01-01 LAB
25(OH)D3 SERPL-MCNC: 27.7 NG/ML (ref 30–100)
ALBUMIN SERPL-MCNC: 4 G/DL (ref 3.4–5)
ALBUMIN/GLOB SERPL: 1.1 {RATIO} (ref 0.8–1.7)
ALP SERPL-CCNC: 73 U/L (ref 45–117)
ALT SERPL-CCNC: 22 U/L (ref 16–61)
ANION GAP SERPL CALC-SCNC: 6 MMOL/L (ref 3–18)
AST SERPL-CCNC: 23 U/L (ref 10–38)
BASOPHILS # BLD: 0 K/UL (ref 0–0.1)
BASOPHILS NFR BLD: 0 % (ref 0–2)
BILIRUB SERPL-MCNC: 0.3 MG/DL (ref 0.2–1)
BUN SERPL-MCNC: 17 MG/DL (ref 7–18)
BUN/CREAT SERPL: 16 (ref 12–20)
CALCIUM SERPL-MCNC: 9.2 MG/DL (ref 8.5–10.1)
CHLORIDE SERPL-SCNC: 103 MMOL/L (ref 100–111)
CHOLEST SERPL-MCNC: 217 MG/DL
CO2 SERPL-SCNC: 30 MMOL/L (ref 21–32)
CREAT SERPL-MCNC: 1.06 MG/DL (ref 0.6–1.3)
DIFFERENTIAL METHOD BLD: ABNORMAL
EOSINOPHIL # BLD: 0.2 K/UL (ref 0–0.4)
EOSINOPHIL NFR BLD: 3 % (ref 0–5)
ERYTHROCYTE [DISTWIDTH] IN BLOOD BY AUTOMATED COUNT: 12.9 % (ref 11.6–14.5)
EST. AVERAGE GLUCOSE BLD GHB EST-MCNC: 108 MG/DL
GLOBULIN SER CALC-MCNC: 3.7 G/DL (ref 2–4)
GLUCOSE SERPL-MCNC: 69 MG/DL (ref 74–99)
HBA1C MFR BLD: 5.4 % (ref 4.2–5.6)
HCT VFR BLD AUTO: 40.3 % (ref 36–48)
HDLC SERPL-MCNC: 67 MG/DL (ref 40–60)
HDLC SERPL: 3.2 {RATIO} (ref 0–5)
HGB BLD-MCNC: 13.5 G/DL (ref 13–16)
LDLC SERPL CALC-MCNC: 129.8 MG/DL (ref 0–100)
LIPID PROFILE,FLP: ABNORMAL
LYMPHOCYTES # BLD: 2.2 K/UL (ref 0.9–3.6)
LYMPHOCYTES NFR BLD: 32 % (ref 21–52)
MAGNESIUM SERPL-MCNC: 2.1 MG/DL (ref 1.6–2.6)
MCH RBC QN AUTO: 31.3 PG (ref 24–34)
MCHC RBC AUTO-ENTMCNC: 33.5 G/DL (ref 31–37)
MCV RBC AUTO: 93.5 FL (ref 74–97)
MONOCYTES # BLD: 0.4 K/UL (ref 0.05–1.2)
MONOCYTES NFR BLD: 6 % (ref 3–10)
NEUTS SEG # BLD: 4 K/UL (ref 1.8–8)
NEUTS SEG NFR BLD: 59 % (ref 40–73)
PLATELET # BLD AUTO: 315 K/UL (ref 135–420)
PMV BLD AUTO: 10.6 FL (ref 9.2–11.8)
POTASSIUM SERPL-SCNC: 3.9 MMOL/L (ref 3.5–5.5)
PROT SERPL-MCNC: 7.7 G/DL (ref 6.4–8.2)
RBC # BLD AUTO: 4.31 M/UL (ref 4.7–5.5)
SODIUM SERPL-SCNC: 139 MMOL/L (ref 136–145)
TRIGL SERPL-MCNC: 101 MG/DL (ref ?–150)
TSH SERPL DL<=0.05 MIU/L-ACNC: 2.23 UIU/ML (ref 0.36–3.74)
VLDLC SERPL CALC-MCNC: 20.2 MG/DL
WBC # BLD AUTO: 6.8 K/UL (ref 4.6–13.2)

## 2019-01-01 PROCEDURE — G0155 HHCP-SVS OF CSW,EA 15 MIN: HCPCS

## 2019-01-01 PROCEDURE — 3336590001 HSPC ROOM AND BOARD

## 2019-01-01 PROCEDURE — A6213 FOAM DRG >16<=48 SQ IN W/BDR: HCPCS

## 2019-01-01 PROCEDURE — HHS10554 SHAMPOO/BODY WASH 8 OZ ALOE VESTA

## 2019-01-01 PROCEDURE — HOSPICE MEDICATION HC HH HOSPICE MEDICATION

## 2019-01-01 PROCEDURE — 0651 HSPC ROUTINE HOME CARE

## 2019-01-01 PROCEDURE — G0156 HHCP-SVS OF AIDE,EA 15 MIN: HCPCS

## 2019-01-01 PROCEDURE — G0299 HHS/HOSPICE OF RN EA 15 MIN: HCPCS

## 2019-01-01 PROCEDURE — PC102 HC HSPC R&B RUG RATE

## 2019-01-01 PROCEDURE — T4523 ADULT SIZE BRIEF/DIAPER LG: HCPCS

## 2019-01-01 PROCEDURE — 84443 ASSAY THYROID STIM HORMONE: CPT

## 2019-01-01 PROCEDURE — 3330220001 HC HSPC R&B RUG RATE

## 2019-01-01 PROCEDURE — T4541 LARGE DISPOSABLE UNDERPAD: HCPCS

## 2019-01-01 PROCEDURE — 82306 VITAMIN D 25 HYDROXY: CPT

## 2019-01-01 PROCEDURE — A6250 SKIN SEAL PROTECT MOISTURIZR: HCPCS

## 2019-01-01 PROCEDURE — PD101 HC HSPC R&B RUG RATE

## 2019-01-01 PROCEDURE — 83735 ASSAY OF MAGNESIUM: CPT

## 2019-01-01 PROCEDURE — A5120 SKIN BARRIER, WIPE OR SWAB: HCPCS

## 2019-01-01 PROCEDURE — 85025 COMPLETE CBC W/AUTO DIFF WBC: CPT

## 2019-01-01 PROCEDURE — 80061 LIPID PANEL: CPT

## 2019-01-01 PROCEDURE — 83036 HEMOGLOBIN GLYCOSYLATED A1C: CPT

## 2019-01-01 PROCEDURE — 80053 COMPREHEN METABOLIC PANEL: CPT

## 2019-01-01 PROCEDURE — T4527 ADULT SIZE PULL-ON LG: HCPCS

## 2019-01-01 PROCEDURE — 36415 COLL VENOUS BLD VENIPUNCTURE: CPT

## 2019-05-30 ENCOUNTER — HOSPITAL ENCOUNTER (INPATIENT)
Age: 64
LOS: 4 days | Discharge: HOSPICE | DRG: 393 | End: 2019-06-04
Attending: EMERGENCY MEDICINE | Admitting: HOSPITALIST
Payer: MEDICARE

## 2019-05-30 ENCOUNTER — HOSPITAL ENCOUNTER (OUTPATIENT)
Dept: CT IMAGING | Age: 64
Setting detail: OBSERVATION
Discharge: HOME | DRG: 393 | End: 2019-05-30
Attending: PHYSICIAN ASSISTANT
Payer: MEDICARE

## 2019-05-30 DIAGNOSIS — K94.23 MALFUNCTION OF PERCUTANEOUS ENDOSCOPIC GASTROSTOMY (PEG) TUBE (HCC): Primary | ICD-10-CM

## 2019-05-30 DIAGNOSIS — Z71.89 GOALS OF CARE, COUNSELING/DISCUSSION: ICD-10-CM

## 2019-05-30 DIAGNOSIS — R53.81 DEBILITY: ICD-10-CM

## 2019-05-30 DIAGNOSIS — F02.80 DEMENTIA ASSOCIATED WITH OTHER UNDERLYING DISEASE WITHOUT BEHAVIORAL DISTURBANCE (HCC): ICD-10-CM

## 2019-05-30 DIAGNOSIS — K94.23 PEG TUBE MALFUNCTION (HCC): ICD-10-CM

## 2019-05-30 DIAGNOSIS — E43 SEVERE PROTEIN-CALORIE MALNUTRITION (HCC): ICD-10-CM

## 2019-05-30 LAB
ALBUMIN SERPL-MCNC: 3.5 G/DL (ref 3.4–5)
ALBUMIN/GLOB SERPL: 0.9 {RATIO} (ref 0.8–1.7)
ALP SERPL-CCNC: 90 U/L (ref 45–117)
ALT SERPL-CCNC: 13 U/L (ref 16–61)
ANION GAP SERPL CALC-SCNC: 7 MMOL/L (ref 3–18)
AST SERPL-CCNC: 18 U/L (ref 15–37)
BASOPHILS # BLD: 0 K/UL (ref 0–0.1)
BASOPHILS # BLD: 0 K/UL (ref 0–0.1)
BASOPHILS NFR BLD: 0 % (ref 0–2)
BASOPHILS NFR BLD: 0 % (ref 0–2)
BILIRUB SERPL-MCNC: 0.5 MG/DL (ref 0.2–1)
BUN SERPL-MCNC: 13 MG/DL (ref 7–18)
BUN/CREAT SERPL: 16 (ref 12–20)
CALCIUM SERPL-MCNC: 9.4 MG/DL (ref 8.5–10.1)
CHLORIDE SERPL-SCNC: 103 MMOL/L (ref 100–108)
CO2 SERPL-SCNC: 27 MMOL/L (ref 21–32)
CREAT SERPL-MCNC: 0.81 MG/DL (ref 0.6–1.3)
DIFFERENTIAL METHOD BLD: ABNORMAL
DIFFERENTIAL METHOD BLD: ABNORMAL
EOSINOPHIL # BLD: 0.1 K/UL (ref 0–0.4)
EOSINOPHIL # BLD: 0.2 K/UL (ref 0–0.4)
EOSINOPHIL NFR BLD: 1 % (ref 0–5)
EOSINOPHIL NFR BLD: 2 % (ref 0–5)
ERYTHROCYTE [DISTWIDTH] IN BLOOD BY AUTOMATED COUNT: 13 % (ref 11.6–14.5)
ERYTHROCYTE [DISTWIDTH] IN BLOOD BY AUTOMATED COUNT: 13.1 % (ref 11.6–14.5)
GLOBULIN SER CALC-MCNC: 4.1 G/DL (ref 2–4)
GLUCOSE SERPL-MCNC: 85 MG/DL (ref 74–99)
HCT VFR BLD AUTO: 35.7 % (ref 36–48)
HCT VFR BLD AUTO: 41.3 % (ref 36–48)
HGB BLD-MCNC: 12 G/DL (ref 13–16)
HGB BLD-MCNC: 14.1 G/DL (ref 13–16)
LYMPHOCYTES # BLD: 1.7 K/UL (ref 0.9–3.6)
LYMPHOCYTES # BLD: 1.8 K/UL (ref 0.9–3.6)
LYMPHOCYTES NFR BLD: 14 % (ref 21–52)
LYMPHOCYTES NFR BLD: 22 % (ref 21–52)
MCH RBC QN AUTO: 30.8 PG (ref 24–34)
MCH RBC QN AUTO: 31.7 PG (ref 24–34)
MCHC RBC AUTO-ENTMCNC: 33.6 G/DL (ref 31–37)
MCHC RBC AUTO-ENTMCNC: 34.1 G/DL (ref 31–37)
MCV RBC AUTO: 91.8 FL (ref 74–97)
MCV RBC AUTO: 92.8 FL (ref 74–97)
MONOCYTES # BLD: 0.6 K/UL (ref 0.05–1.2)
MONOCYTES # BLD: 0.6 K/UL (ref 0.05–1.2)
MONOCYTES NFR BLD: 5 % (ref 3–10)
MONOCYTES NFR BLD: 8 % (ref 3–10)
NEUTS SEG # BLD: 5.6 K/UL (ref 1.8–8)
NEUTS SEG # BLD: 9.9 K/UL (ref 1.8–8)
NEUTS SEG NFR BLD: 68 % (ref 40–73)
NEUTS SEG NFR BLD: 80 % (ref 40–73)
PLATELET # BLD AUTO: 260 K/UL (ref 135–420)
PLATELET # BLD AUTO: 288 K/UL (ref 135–420)
PMV BLD AUTO: 10.5 FL (ref 9.2–11.8)
PMV BLD AUTO: 10.6 FL (ref 9.2–11.8)
POTASSIUM SERPL-SCNC: 3.8 MMOL/L (ref 3.5–5.5)
PROT SERPL-MCNC: 7.6 G/DL (ref 6.4–8.2)
RBC # BLD AUTO: 3.89 M/UL (ref 4.7–5.5)
RBC # BLD AUTO: 4.45 M/UL (ref 4.7–5.5)
SODIUM SERPL-SCNC: 137 MMOL/L (ref 136–145)
WBC # BLD AUTO: 12.4 K/UL (ref 4.6–13.2)
WBC # BLD AUTO: 8.2 K/UL (ref 4.6–13.2)

## 2019-05-30 PROCEDURE — 96372 THER/PROPH/DIAG INJ SC/IM: CPT

## 2019-05-30 PROCEDURE — 96361 HYDRATE IV INFUSION ADD-ON: CPT

## 2019-05-30 PROCEDURE — 85025 COMPLETE CBC W/AUTO DIFF WBC: CPT

## 2019-05-30 PROCEDURE — 74011250636 HC RX REV CODE- 250/636: Performed by: INTERNAL MEDICINE

## 2019-05-30 PROCEDURE — 96360 HYDRATION IV INFUSION INIT: CPT

## 2019-05-30 PROCEDURE — 36415 COLL VENOUS BLD VENIPUNCTURE: CPT

## 2019-05-30 PROCEDURE — 99284 EMERGENCY DEPT VISIT MOD MDM: CPT

## 2019-05-30 PROCEDURE — 99218 HC RM OBSERVATION: CPT

## 2019-05-30 PROCEDURE — 74011250636 HC RX REV CODE- 250/636

## 2019-05-30 PROCEDURE — 74011000258 HC RX REV CODE- 258: Performed by: HOSPITALIST

## 2019-05-30 PROCEDURE — 80053 COMPREHEN METABOLIC PANEL: CPT

## 2019-05-30 PROCEDURE — 77030020186 HC BOOT HL PROTCT SAGE -B

## 2019-05-30 PROCEDURE — 74011636320 HC RX REV CODE- 636/320: Performed by: PHYSICIAN ASSISTANT

## 2019-05-30 PROCEDURE — 74011250636 HC RX REV CODE- 250/636: Performed by: HOSPITALIST

## 2019-05-30 PROCEDURE — 74177 CT ABD & PELVIS W/CONTRAST: CPT

## 2019-05-30 PROCEDURE — 74011250636 HC RX REV CODE- 250/636: Performed by: EMERGENCY MEDICINE

## 2019-05-30 RX ORDER — MIRTAZAPINE 15 MG/1
30 TABLET, FILM COATED ORAL
Status: DISCONTINUED | OUTPATIENT
Start: 2019-05-30 | End: 2019-06-04 | Stop reason: HOSPADM

## 2019-05-30 RX ORDER — SODIUM CHLORIDE 9 MG/ML
90 INJECTION, SOLUTION INTRAVENOUS CONTINUOUS
Status: DISCONTINUED | OUTPATIENT
Start: 2019-05-30 | End: 2019-05-30

## 2019-05-30 RX ORDER — ACETAMINOPHEN 325 MG/1
650 TABLET ORAL
Status: DISCONTINUED | OUTPATIENT
Start: 2019-05-30 | End: 2019-06-04 | Stop reason: HOSPADM

## 2019-05-30 RX ORDER — IPRATROPIUM BROMIDE AND ALBUTEROL SULFATE 2.5; .5 MG/3ML; MG/3ML
3 SOLUTION RESPIRATORY (INHALATION)
Status: DISCONTINUED | OUTPATIENT
Start: 2019-05-30 | End: 2019-06-04 | Stop reason: HOSPADM

## 2019-05-30 RX ORDER — ONDANSETRON 2 MG/ML
4 INJECTION INTRAMUSCULAR; INTRAVENOUS
Status: DISCONTINUED | OUTPATIENT
Start: 2019-05-30 | End: 2019-06-04 | Stop reason: HOSPADM

## 2019-05-30 RX ORDER — SERTRALINE HYDROCHLORIDE 50 MG/1
100 TABLET, FILM COATED ORAL DAILY
Status: DISCONTINUED | OUTPATIENT
Start: 2019-05-31 | End: 2019-06-04 | Stop reason: HOSPADM

## 2019-05-30 RX ORDER — FAMOTIDINE 10 MG/ML
20 INJECTION INTRAVENOUS EVERY 12 HOURS
Status: DISCONTINUED | OUTPATIENT
Start: 2019-05-30 | End: 2019-05-31

## 2019-05-30 RX ORDER — MEMANTINE HYDROCHLORIDE 10 MG/1
5 TABLET ORAL 2 TIMES DAILY
Status: DISCONTINUED | OUTPATIENT
Start: 2019-05-30 | End: 2019-06-04 | Stop reason: HOSPADM

## 2019-05-30 RX ORDER — HEPARIN SODIUM 5000 [USP'U]/ML
INJECTION, SOLUTION INTRAVENOUS; SUBCUTANEOUS
Status: COMPLETED
Start: 2019-05-30 | End: 2019-05-30

## 2019-05-30 RX ORDER — LANOLIN ALCOHOL/MO/W.PET/CERES
6 CREAM (GRAM) TOPICAL
Status: DISCONTINUED | OUTPATIENT
Start: 2019-05-30 | End: 2019-06-04 | Stop reason: HOSPADM

## 2019-05-30 RX ORDER — CLONAZEPAM 0.5 MG/1
1 TABLET ORAL
Status: DISCONTINUED | OUTPATIENT
Start: 2019-05-30 | End: 2019-06-04 | Stop reason: HOSPADM

## 2019-05-30 RX ORDER — DEXTROSE MONOHYDRATE AND SODIUM CHLORIDE 5; .9 G/100ML; G/100ML
75 INJECTION, SOLUTION INTRAVENOUS CONTINUOUS
Status: DISCONTINUED | OUTPATIENT
Start: 2019-05-30 | End: 2019-06-01

## 2019-05-30 RX ORDER — HEPARIN SODIUM 5000 [USP'U]/ML
5000 INJECTION, SOLUTION INTRAVENOUS; SUBCUTANEOUS EVERY 12 HOURS
Status: DISCONTINUED | OUTPATIENT
Start: 2019-05-30 | End: 2019-06-04 | Stop reason: HOSPADM

## 2019-05-30 RX ADMIN — HEPARIN SODIUM 5000 UNITS: 5000 INJECTION INTRAVENOUS; SUBCUTANEOUS at 20:31

## 2019-05-30 RX ADMIN — FAMOTIDINE 20 MG: 10 INJECTION INTRAVENOUS at 16:48

## 2019-05-30 RX ADMIN — SODIUM CHLORIDE 90 ML/HR: 900 INJECTION, SOLUTION INTRAVENOUS at 05:39

## 2019-05-30 RX ADMIN — DEXTROSE MONOHYDRATE AND SODIUM CHLORIDE 75 ML/HR: 5; .9 INJECTION, SOLUTION INTRAVENOUS at 23:18

## 2019-05-30 RX ADMIN — DEXTROSE MONOHYDRATE AND SODIUM CHLORIDE 75 ML/HR: 5; .9 INJECTION, SOLUTION INTRAVENOUS at 08:49

## 2019-05-30 RX ADMIN — FAMOTIDINE 20 MG: 10 INJECTION INTRAVENOUS at 20:32

## 2019-05-30 RX ADMIN — HEPARIN SODIUM 5000 UNITS: 5000 INJECTION INTRAVENOUS; SUBCUTANEOUS at 09:16

## 2019-05-30 RX ADMIN — IOPAMIDOL 100 ML: 612 INJECTION, SOLUTION INTRAVENOUS at 15:27

## 2019-05-30 NOTE — ED PROVIDER NOTES
EMERGENCY DEPARTMENT HISTORY AND PHYSICAL EXAM 
 
5:49 AM 
 
 
Date: 5/30/2019 Patient Name: Williams Rea History of Presenting Illness Chief Complaint Patient presents with  Feeding Tube Problem History Provided By: EMS Additional History (Context): Williams Rea is a 58 y.o. male with history of schizoaffective disorder, failure to thrive, status post recent endoscopic placement of a PEG tube yesterday, who presents after pulling his PEG tube at his acute rehab. He was sent for replacement. Patient is not able to give any history. PCP: Kaia Mahmood MD 
 
Current Facility-Administered Medications Medication Dose Route Frequency Provider Last Rate Last Dose  
 0.9% sodium chloride infusion  90 mL/hr IntraVENous CONTINUOUS Corinne Patrick MD      
 
Current Outpatient Medications Medication Sig Dispense Refill  DIMETHICONE EX Apply  to affected area as needed.  haloperidol (HALDOL) 2 mg/mL oral concentrate Take 1 mg by mouth two (2) times daily as needed.  ketoconazole (NIZORAL) 2 % topical cream Apply  to affected area two (2) times a day.  mirtazapine (REMERON) 30 mg tablet Take 30 mg by mouth nightly.  sertraline (ZOLOFT) 100 mg tablet Take  by mouth daily.  multivitamin (ONE A DAY) tablet Take 1 Tab by mouth daily.  clonazePAM (KLONOPIN) 1 mg tablet Take  by mouth nightly.  memantine (NAMENDA) 10 mg tablet Take  by mouth two (2) times a day.  acetaminophen (TYLENOL) 325 mg tablet Take 325 mg by mouth every six (6) hours as needed.  cholecalciferol (VITAMIN D3) 1,000 unit tablet Take 1,000 Units by mouth daily.  melatonin 3 mg tablet Take 6 mg by mouth nightly.  cetaphil (CETAPHIL) topical cream Apply  to affected area daily as needed. Past History Past Medical History: 
Past Medical History:  
Diagnosis Date  Alcohol abuse   
 in remission  Allergic rhinitis  Anxiety  Asthma  Dementia  Difficulty walking  Dysphagia  Esophageal obstruction  GERD (gastroesophageal reflux disease)  Hepatitis, chronic ([facility] [address] Hepatitis C  
 Insomnia  Major depression  Muscle weakness  Psychosis ([facility] [address]  PTSD (post-traumatic stress disorder)  PUD (peptic ulcer disease)  Schizoaffective disorder, depressive type ([facility] [address]  Seborrheic dermatitis  Vitamin D deficiency  Weight loss Past Surgical History: 
Past Surgical History:  
Procedure Laterality Date [address] UNLISTED Peptic ulcer surgery  HX APPENDECTOMY  HX KNEE ARTHROSCOPY Both knees  HX KNEE REPLACEMENT Right Family History: No family history on file. Social History: 
Social History Tobacco Use  Smoking status: Former Smoker Last attempt to quit: 10/25/2010 Years since quittin.6  Smokeless tobacco: Never Used Substance Use Topics  Alcohol use: Not Currently  Drug use: Not Currently Allergies: Allergies Allergen Reactions  Other Plant, Animal, Environmental Unknown (comments) CATS Review of Systems Review of Systems Unable to perform ROS: Psychiatric disorder Physical Exam  
 
Visit Vitals /78 Pulse 94 Temp 97.7 °F (36.5 °C) Resp 16 Ht 5' 7\" (1.702 m) Wt 46.7 kg (103 lb) SpO2 98% BMI 16.13 kg/m² Physical Exam  
Constitutional: He appears well-developed and well-nourished. HENT:  
Head: Normocephalic and atraumatic. Mouth/Throat: Oropharynx is clear and moist.  
Eyes: Conjunctivae are normal.  
Neck: Normal range of motion. Cardiovascular: Normal rate. Pulmonary/Chest: Effort normal.  
Abdominal: Soft. Bowel sounds are normal. He exhibits no distension. There is no tenderness. Musculoskeletal: Normal range of motion. Neurological: He is alert. Skin: Skin is warm and dry. Diagnostic Study Results Labs - 
 No results found for this or any previous visit (from the past 12 hour(s)). Radiologic Studies - No orders to display Medical Decision Making I am the first provider for this patient. I reviewed the vital signs, available nursing notes, past medical history, past surgical history, family history and social history. Vital Signs-Reviewed the patient's vital signs. Records Reviewed: Nursing Notes (Time of Review: 5:49 AM) Provider Notes (Medical Decision Making): Mayra Alves is a 58 y.o. male with history of schizoaffective disorder, failure to thrive, status post recent endoscopic placement of a PEG tube yesterday, who presents after pulling his PEG tube at his acute rehab. He was sent for replacement. Patient is not able to give any history. His abdomen is soft, and he is otherwise well-appearing. Differential Diagnosis: PEG tube removal from an endoscopically placed PEG tube less than 24 hours ago. Patient with no current signs of peritonitis. Testing: CBC, CMP Treatments: Maintenance IV fluids, n.p.o. Re-evaluations: 
Case discussed over the phone with the patient's surgeon, who states that he will likely not replace the tube in the near future. He did recommend the patient remain n.p.o. for 24 hours, watching for signs of peritonitis. I discussed the case with [facility], as this is where the PEG tube was placed, but they do not have any bed availability and were not able to accept transfer. Patient will be admitted to observation for IV fluids and serial abdominal exams. Diagnosis Clinical Impression: 1. Malfunction of percutaneous endoscopic gastrostomy (PEG) tube ([facility] [address] Disposition: Admit Follow-up Information None Patient's Medications Start Taking No medications on file Continue Taking ACETAMINOPHEN (TYLENOL) 325 MG TABLET    Take 325 mg by mouth every six (6) hours as needed. CETAPHIL (CETAPHIL) TOPICAL CREAM    Apply  to affected area daily as needed. CHOLECALCIFEROL (VITAMIN D3) 1,000 UNIT TABLET    Take 1,000 Units by mouth daily. CLONAZEPAM (KLONOPIN) 1 MG TABLET    Take  by mouth nightly. DIMETHICONE EX    Apply  to affected area as needed. HALOPERIDOL (HALDOL) 2 MG/ML ORAL CONCENTRATE    Take 1 mg by mouth two (2) times daily as needed. KETOCONAZOLE (NIZORAL) 2 % TOPICAL CREAM    Apply  to affected area two (2) times a day. MELATONIN 3 MG TABLET    Take 6 mg by mouth nightly. MEMANTINE (NAMENDA) 10 MG TABLET    Take  by mouth two (2) times a day. MIRTAZAPINE (REMERON) 30 MG TABLET    Take 30 mg by mouth nightly. MULTIVITAMIN (ONE A DAY) TABLET    Take 1 Tab by mouth daily. SERTRALINE (ZOLOFT) 100 MG TABLET    Take  by mouth daily. These Medications have changed No medications on file Stop Taking No medications on file  
 
_______________________________ Attestations: 
Scribe Attestation Kyle Hopper MD acting as a scribe for and in the presence of Mary Page MD     
May 30, 2019 at 5:53 AM 
    
Provider Attestation:     
I personally performed the services described in the documentation, reviewed the documentation, as recorded by the scribe in my presence, and it accurately and completely records my words and actions. May 30, 2019 at 5:53 AM - Mary Page MD   
_______________________________

## 2019-05-30 NOTE — ED NOTES
Pt to ED via EMS from [facility] and rehab. Pt had PEG tube placed 5/29/19, and pt was found in room, having removed PEG tube, with PEG tube in his hand. Incision site clean, no redness noted. Pt has dementia, and does not verbalize words. Per EMS, this is pt's baseline. NAD noted. ABCs intact. VSS. Will continue to monitor.

## 2019-05-30 NOTE — PROGRESS NOTES
Speech Therapy:     Orders received. Pt currently NPO for abdominal CT. Will complete next business date or as medical condition permits.      Thank you for this referral.    Mabel Vasques M.S. CCC-SLP/L  Speech-Language Pathologist

## 2019-05-30 NOTE — CONSULTS
[email or web address] 
[phone] GASTROENTEROLOGY CONSULT Impression: 1. PEG removed by patient 2. Hx of Schizoaffective disorder and dementia Plan: 1. Unable to replace due to lack of track with fresh PEG placement yesterday. 2. Recommend IR placement or let heal and feed by alternative means then follow up with primary GI team for replacement when healed Chief Complaint: dislodged PEG tube HPI: 
Tanika Schmidt is a 58 y.o. male who I am being asked to see in consultation for an opinion regarding patient removal of PEG 24 hours after insertion, site fresh, no track, no active bleeding. Patient refuses to answer questions and unable to provide history, no family present at bedside. History obtained by chart. PEG placed at [address] yesterday by surgeon Dr. Ge Mar, patient apparently able to swallow. Hx of GERD and malnutrition. PEG placed for malnutrition and not dysphagia. PMH:  
Past Medical History:  
Diagnosis Date  Alcohol abuse   
 in remission  Allergic rhinitis  Anxiety  Asthma  Dementia  Difficulty walking  Dysphagia  Esophageal obstruction  GERD (gastroesophageal reflux disease)  Hepatitis, chronic ([address] Hepatitis C  
 Insomnia  Major depression  Muscle weakness  Psychosis ([address]  PTSD (post-traumatic stress disorder)  PUD (peptic ulcer disease)  Schizoaffective disorder, depressive type ([address]  Seborrheic dermatitis  Vitamin D deficiency  Weight loss PSH:  
Past Surgical History:  
Procedure Laterality Date [address] UNLISTED Peptic ulcer surgery  HX APPENDECTOMY  HX KNEE ARTHROSCOPY Both knees  HX KNEE REPLACEMENT Right Social HX:  
Social History Socioeconomic History  Marital status:  Spouse name: Not on file  Number of children: Not on file  Years of education: Not on file  Highest education level: Not on file Occupational History  Not on file Social Needs  Financial resource strain: Not on file  Food insecurity:  
  Worry: Not on file Inability: Not on file  Transportation needs:  
  Medical: Not on file Non-medical: Not on file Tobacco Use  Smoking status: Former Smoker Last attempt to quit: 10/25/2010 Years since quittin.6  Smokeless tobacco: Never Used Substance and Sexual Activity  Alcohol use: Not Currently  Drug use: Not Currently  Sexual activity: Not on file Lifestyle  Physical activity:  
  Days per week: Not on file Minutes per session: Not on file  Stress: Not on file Relationships  Social connections:  
  Talks on phone: Not on file Gets together: Not on file Attends [employer] service: Not on file Active member of club or organization: Not on file Attends meetings of clubs or organizations: Not on file Relationship status: Not on file  Intimate partner violence:  
  Fear of current or ex partner: Not on file Emotionally abused: Not on file Physically abused: Not on file Forced sexual activity: Not on file Other Topics Concern  Not on file Social History Narrative  Not on file FHX:  
No family history on file. Allergy: Allergies Allergen Reactions  Other Plant, Animal, Environmental Unknown (comments) CATS Patient Active Problem List  
Diagnosis Code  Gastroesophageal reflux disease without esophagitis K21.9  PEG tube malfunction ([facility]) Z62.48 Home Medications: (Not in a hospital admission) Review of Systems: Unable to perform due to patient mental status Visit Vitals /72 (BP Patient Position: At rest) Pulse 87 Temp 98 °F (36.7 °C) Resp 16 Ht 5' 7\" (1.702 m) Wt 46.7 kg (103 lb) SpO2 98% BMI 16.13 kg/m² Physical Assessment: constitutional: appearance: well developed, well nourished, thin habitus, no deformities, in no acute distress. skin: inspection: no rashes, ulcers, icterus or other lesions; no clubbing or telangiectasias. eyes: inspection: normal conjunctivae and lids; no jaundice pupils: normal 
ENMT: mouth: normal oral mucosa,lips and gums; 
neck: thyroid: normal size, consistency and position; no masses or tenderness. respiratory: effort: normal chest excursion; no intercostal retraction or accessory muscle use. cardiovascular: normal rhythm; no thrill or murmurs. abdominal: abdomen: normal consistency; no tenderness or masses. PEG site with no active bleeding, discharge, or erythema   
rectal: hemoccult/guaiac: not performed. musculoskeletal:  normal range of motion; no pain, deformity or contracture. neurologic: cranial nerves: II-XII normal.  
psychiatric: limited Basic Metabolic Profile Recent Labs 05/30/19 
1407   
K 3.8  CO2 27 BUN 13  
GLU 85  
CA 9.4  
  
  
CBC w/Diff Recent Labs 05/30/19 
5501 WBC 12.4 RBC 4.45* HGB 14.1 HCT 41.3 MCV 92.8 MCH 31.7 MCHC 34.1  
RDW 13.1  Recent Labs 05/30/19 
0251 GRANS 80* LYMPH 14* EOS 1 Hepatic Function Recent Labs 05/30/19 
8256 ALB 3.5 TP 7.6 TBILI 0.5 SGOT 18 AP 90 Coags No results for input(s): PTP, INR, APTT in the last 72 hours. No lab exists for component: INREXT Jelly Mayer NP. [facility], [address] [place] [address] Cell: [phone] [email or web address]

## 2019-05-30 NOTE — ROUTINE PROCESS
Received pt from the ER pt awake and nonverbal.  No acute distress noted. Pt has stage 1 to sacrum and bilat heels.   Heel protector applied and barrier cream.

## 2019-05-30 NOTE — PROGRESS NOTES
NUTRITION    Nursing Referral: [facility]ST, EN/PN PTA      RECOMMENDATIONS / PLAN:     - Monitor readiness for tube feeding to be started. Please consult Nutrition for tube feeding regimen recommendations. - Continue IV fluids until oral diet started and intake adequate. - Continue RD inpatient monitoring and evaluation. NUTRITION INTERVENTIONS & DIAGNOSIS:     [x] Enteral nutrition: tube feeding not started due to PEG dislodgement  [x] Meals/snacks: modified composition; NPO  [x] IV fluids:  D5 NS at 75 mL/hr (90 gm dextrose, 306 kcal per day)    Nutrition Diagnosis:  Inadequate energy intake related to inability to tolerate po and PEG tube dislodgement as evidenced by pt NPO, tube feed on hold. Underweight related to predicted prolonged inadequate oral intake as evidenced by BMI 16.13 kg/m^2    Patient meets criteria for Severe Protein Calorie Malnutrition as evidenced by:   ASPEN Malnutrition Criteria  Acute Illness, Chronic Illness, or Social/Enviornmental: Chronic illness  Body Fat: Severe(buccal, triceps)  Muscle Mass: Severe(calves, temples)  ASPEN Malnutrition Score - Chronic Illness: 12  Chronic Illness - Malnutrition Diagnosis: Severe malnutrition. ASSESSMENT:     Pt alert, dementia; not answering questions. Did denied having any discomfort at this time. Pt admitted for dislodged PEG tube; noted removed by patient. GI unable to replace PEG. Noted consult to IR for possible replacement. NFPE conducted. Noted pt had PEG placed on 5/29/19 due to poor po intake; does not have dysphagia per H&P.  Admitted from rehab facility    Average po intake/ EN infusion adequate to meet patients estimated nutritional needs:   [] Yes     [x] No   [] Unable to determine at this time    Diet: DIET NPO Except Meds      Food Allergies:  None known   Current Appetite:   [] Good     [] Fair     [] Poor     [x] Other: NPO  Appetite/meal intake prior to admission:   [] Good     [] Fair     [] Poor     [x] Other: unknown  Feeding Limitations:  [] Swallowing difficulty    [] Chewing difficulty    [x] Other: dementia  Current Meal Intake: No data found. BM: PTA  Skin Integrity: abdominal incision (PEG site)  Edema:   [x] No     [] Yes   Pertinent Medications: Reviewed    Recent Labs     05/30/19  0554      K 3.8      CO2 27   GLU 85   BUN 13   CREA 0.81   CA 9.4   ALB 3.5   SGOT 18   ALT 13*     No intake or output data in the 24 hours ending 05/30/19 1302    Anthropometrics:  Ht Readings from Last 1 Encounters:   05/30/19 5' 7\" (1.702 m)     Last 3 Recorded Weights in this Encounter    05/30/19 0350   Weight: 46.7 kg (103 lb)     Body mass index is 16.13 kg/m². Underweight     Weight History:  -33 lb (24%) x 1.5 year PTA per chart hx    Weight Metrics 5/30/2019 5/29/2019 10/26/2017   Weight 103 lb 103 lb 1 oz 136 lb   BMI 16.13 kg/m2 16.14 kg/m2 20.68 kg/m2        Admitting Diagnosis: PEG tube malfunction (HCC) [K94.23]  Pertinent PMHx:  Alcohol abuse, dementia, dysphagia (although also noted that pt currently does not have this), GERD, esophageal obstruction, depression, PUD, vitamin D deficiency     Education Needs:        [x] None identified  [] Identified - Not appropriate at this time  []  Identified and addressed - refer to education log  Learning Limitations:   [] None identified  [x] Identified: dementia     Cultural, Synagogue & ethnic food preferences:  [x] None identified    [] Identified and addressed     ESTIMATED NUTRITION NEEDS:     +500 kcal daily for promotion of wt gain. Calories: 7376-4991 kcal (MSJx1.2-1.3) based on  [x] Actual BW: 47 kg      [] IBW   Protein: 38-56 gm (0.8-1.2 gm/kg) based on  [x] Actual BW      [] IBW   Fluid: 1 mL/kcal     MONITORING & EVALUATION:     Nutrition Goal(s):   1. Nutritional needs will be met through adequate oral intake or nutrition support within the next 7 days.   Outcome:  [] Met/Ongoing    [] Progressing towards goal    [] Not progressing towards goal    [x] New/Initial goal    2. Weight gain of 1-2 lbs over the next 7 days.    Outcome:  [] Met/Ongoing    [] Progressing towards goal    [] Not progressing towards goal    [x] New/Initial goal     Monitoring:   [] Food and beverage intake   [x] Diet order   [x] Nutrition-focused physical findings   [x] Treatment/therapy   [] Weight   [] Enteral nutrition intake        Previous Recommendations (for follow-up assessments only):     []   Implemented       []   Not Implemented (RD to address)      [] No Longer Appropriate     [] No Recommendation Made     Discharge Planning:  Pending plan of care; tube feedings via PEG when replaced  [x] Participated in care planning, discharge planning, & interdisciplinary rounds as appropriate      Reyes Bougie, [address] [place] [address]   Pager: [phone]

## 2019-05-30 NOTE — H&P
History & Physical 
Patient: Eric Younger MRN: [id number]  [facility]: [id number] YOB: 1955  Age: 58 y.o. Sex: male DOA: 5/30/2019 Chief Complaint Patient presents with  Feeding Tube Problem HPI:  
 
Eric Younger is a 58 y.o. male who has a hx of psychotic disorder and dementia, also has GERD and malnutrition. Patient had a PEG tube inserted yesterday at [facility] reportedly because he was not taking enough po. He does not have dysphagia. Patient can only answer a few questions and incoherently, therefore no reliable history could be obtained. Hx supplemented from chart review and ED documentation. Patient is currently at a rehab facility. He was brought to hospital because he had pulled out his G-tube. I'm unable to ascertain whether or not he has pain. His surgeon at [facility] has no plans to reinsert the tube. Serial abdominal exams and monitoring for possible peritonitis advised. Past Medical History:  
Diagnosis Date  Alcohol abuse   
 in remission  Allergic rhinitis  Anxiety  Asthma  Dementia  Difficulty walking  Dysphagia  Esophageal obstruction  GERD (gastroesophageal reflux disease)  Hepatitis, chronic ([address] Hepatitis C  
 Insomnia  Major depression  Muscle weakness  Psychosis ([address]  PTSD (post-traumatic stress disorder)  PUD (peptic ulcer disease)  Schizoaffective disorder, depressive type ([address]  Seborrheic dermatitis  Vitamin D deficiency  Weight loss Past Surgical History:  
Procedure Laterality Date [address] UNLISTED Peptic ulcer surgery  HX APPENDECTOMY  HX KNEE ARTHROSCOPY Both knees  HX KNEE REPLACEMENT Right No family history on file. Social History Socioeconomic History  Marital status:  Spouse name: Not on file  Number of children: Not on file  Years of education: Not on file  Highest education level: Not on file Tobacco Use  Smoking status: Former Smoker Last attempt to quit: 10/25/2010 Years since quittin.6  Smokeless tobacco: Never Used Substance and Sexual Activity  Alcohol use: Not Currently  Drug use: Not Currently Prior to Admission medications Medication Sig Start Date End Date Taking? Authorizing Provider DIMETHICONE EX Apply  to affected area as needed. 17   Provider, Historical  
haloperidol (HALDOL) 2 mg/mL oral concentrate Take 1 mg by mouth two (2) times daily as needed. 19   Provider, Historical  
ketoconazole (NIZORAL) 2 % topical cream Apply  to affected area two (2) times a day. 19   Provider, Historical  
mirtazapine (REMERON) 30 mg tablet Take 30 mg by mouth nightly. Provider, Historical  
sertraline (ZOLOFT) 100 mg tablet Take  by mouth daily. Provider, Historical  
multivitamin (ONE A DAY) tablet Take 1 Tab by mouth daily. Provider, Historical  
clonazePAM (KLONOPIN) 1 mg tablet Take  by mouth nightly. Provider, Historical  
memantine (NAMENDA) 10 mg tablet Take  by mouth two (2) times a day. Provider, Historical  
acetaminophen (TYLENOL) 325 mg tablet Take 325 mg by mouth every six (6) hours as needed. 17   Provider, Historical  
cholecalciferol (VITAMIN D3) 1,000 unit tablet Take 1,000 Units by mouth daily. 17   Provider, Historical  
melatonin 3 mg tablet Take 6 mg by mouth nightly. 17   Provider, Historical  
cetaphil (CETAPHIL) topical cream Apply  to affected area daily as needed. 17   Provider, Historical  
 
 
Allergies Allergen Reactions  Other Plant, Animal, Environmental Unknown (comments) CATS Review of Systems Unable to obtain Physical Exam:  
 
Physical Exam: 
Visit Vitals /78 Pulse 94 Temp 97.7 °F (36.5 °C) Resp 16 Ht 5' 7\" (1.702 m) Wt 46.7 kg (103 lb) SpO2 98% BMI 16.13 kg/m² O2 Device: Room air Temp (24hrs), Av.7 °F (36.5 °C), Min:97.7 °F (36.5 °C), Max:97.7 °F (36.5 °C) No intake/output data recorded. No intake/output data recorded. General:  Alert, cooperative, no distress, appears older than Head:  Normocephalic, without obvious abnormality, atraumatic. Eyes:  Conjunctivae/corneas clear. PERRL, EOMs intact. Nose: Nares normal. No drainage or sinus tenderness. Throat: Lips, mucosa, and tongue normal. Teeth and gums normal.  
Neck: Supple, symmetrical, trachea midline, no adenopathy, thyroid: no enlargement/tenderness/nodules, no carotid bruit and no JVD. Back:   ROM normal. No CVA tenderness. Lungs:   Clear to auscultation bilaterally. Chest wall:  No tenderness or deformity. Heart:  Regular rate and rhythm, S1, S2 normal, no murmur, click, rub or gallop. Abdomen: Soft, non-tender. Bowel sounds normal. No masses,  No organomegaly. Clean dressing over the PEG site, no discharge or erythema Extremities: Extremities normal, atraumatic, no cyanosis or edema. Bilateral tremors of all extremities Pulses: 2+ and symmetric all extremities. Skin: Skin color, texture, turgor normal. No rashes or lesions Neurologic: CNII-XII intact. No focal motor or sensory deficit. Labs Reviewed: 
 
Recent Results (from the past 24 hour(s)) CBC WITH AUTOMATED DIFF Collection Time: 19  5:54 AM  
Result Value Ref Range WBC 12.4 4.6 - 13.2 K/uL  
 RBC 4.45 (L) 4.70 - 5.50 M/uL  
 HGB 14.1 13.0 - 16.0 g/dL HCT 41.3 36.0 - 48.0 % MCV 92.8 74.0 - 97.0 FL  
 MCH 31.7 24.0 - 34.0 PG  
 MCHC 34.1 31.0 - 37.0 g/dL  
 RDW 13.1 11.6 - 14.5 % PLATELET 084 515 - 375 K/uL MPV 10.6 9.2 - 11.8 FL  
 NEUTROPHILS 80 (H) 40 - 73 % LYMPHOCYTES 14 (L) 21 - 52 % MONOCYTES 5 3 - 10 % EOSINOPHILS 1 0 - 5 % BASOPHILS 0 0 - 2 %  
 ABS. NEUTROPHILS 9.9 (H) 1.8 - 8.0 K/UL  
 ABS. LYMPHOCYTES 1.7 0.9 - 3.6 K/UL ABS. MONOCYTES 0.6 0.05 - 1.2 K/UL  
 ABS. EOSINOPHILS 0.1 0.0 - 0.4 K/UL  
 ABS. BASOPHILS 0.0 0.0 - 0.1 K/UL  
 DF AUTOMATED METABOLIC PANEL, COMPREHENSIVE Collection Time: 05/30/19  5:54 AM  
Result Value Ref Range Sodium 137 136 - 145 mmol/L Potassium 3.8 3.5 - 5.5 mmol/L Chloride 103 100 - 108 mmol/L  
 CO2 27 21 - 32 mmol/L Anion gap 7 3.0 - 18 mmol/L Glucose 85 74 - 99 mg/dL BUN 13 7.0 - 18 MG/DL Creatinine 0.81 0.6 - 1.3 MG/DL  
 BUN/Creatinine ratio 16 12 - 20 GFR est AA >60 >60 ml/min/1.73m2 GFR est non-AA >60 >60 ml/min/1.73m2 Calcium 9.4 8.5 - 10.1 MG/DL Bilirubin, total 0.5 0.2 - 1.0 MG/DL  
 ALT (SGPT) 13 (L) 16 - 61 U/L  
 AST (SGOT) 18 15 - 37 U/L Alk. phosphatase 90 45 - 117 U/L Protein, total 7.6 6.4 - 8.2 g/dL Albumin 3.5 3.4 - 5.0 g/dL Globulin 4.1 (H) 2.0 - 4.0 g/dL A-G Ratio 0.9 0.8 - 1.7 Procedures/imaging: see electronic medical records for all procedures/Xrays and details which were not copied into this note but were reviewed prior to creation of Plan Assessment/Plan 1. Dislodged PEG Tube 2. Malnutrition 3. Hx of Psychotic disorder (Schizoaffective) 4. Dementia 5. GERD Plan - Patient had pulled out his G tube about 24 hours after its insertion - presents risk for peritonitis 
- His surgeon at [facility] does not appear inclined to reinsert tube, patient is able to swallow 
- will admit for serial abdominal exams and watching for peritonitis - will continue home medications 
- maintenance fluids 
- analgesics DVT ppx - heparin Active Problems: PEG tube malfunction ([facility] [address] (5/30/2019) Yajaira Rodríguez MD 
May 30, 2019

## 2019-05-30 NOTE — ROUTINE PROCESS
TRANSFER - OUT REPORT:    Verbal report given to RN(name) on Tanika Minors  being transferred to (unit) for routine progression of care       Report consisted of patients Situation, Background, Assessment and   Recommendations(SBAR). Information from the following report(s) SBAR was reviewed with the receiving nurse. Lines:   Peripheral IV 05/30/19 Left Forearm (Active)   Site Assessment Clean, dry, & intact 5/30/2019  5:57 AM        Opportunity for questions and clarification was provided.       Patient transported with:   [employer]

## 2019-05-30 NOTE — ROUTINE PROCESS
Bedside and Verbal shift change report given by Jamilah Paris RN (offgoing nurse) to James Valdes RN (oncoming nurse). Report included the following information SBAR, Kardex and MAR.

## 2019-05-30 NOTE — ROUTINE PROCESS
Bedside and Verbal shift change report given to Brian Calvo, RN, RN (oncoming nurse) by Salvador Patton, RN (offgoing nurse).  Report included the following information SBAR, Kardex and [facility]

## 2019-05-30 NOTE — CONSULTS
Interventional Radiology     Consult received from Dr. Tatiana Frank for evaluation of dislodged PEG tube. Given that the gastrostomy tube was placed by GI yesterday, there is no tract for tube replacement. Patient would require a new gastrostomy tube by IR, GI or Surgery.       Case and images reviewed by Dr. Carine Moore. Removal of a gastrostomy tube after recent placement presents a surgical emergency given the risk of peritonitis. Recommend cross-sectional imaging (CT abd/pelvis with IV contrast) to assess abdomen. Will review CT once complete.     [address]

## 2019-05-30 NOTE — PROGRESS NOTES
SUBJECTIVE:    Patient was seen earlier. Staring it me and did not answer any questions. OBJECTIVE:    /64   Pulse 87   Temp 98 °F (36.7 °C)   Resp 16   Ht 5' 7\" (1.702 m)   Wt 46.7 kg (103 lb)   SpO2 97%   BMI 16.13 kg/m²     General appearance - awake, NAD  Chest - decreased air entry noted in bases, poor efforts  Heart - S1 and S2 normal  Abdomen - soft, nontender, nondistended, Bowel sounds present, PEG site dressing +  Neurological - awake, moves all extremities well  Extremities - no pedal edema noted    ASSESSMENT:    1. Dislodged PEG Tube  2. Severe protein Malnutrition  3. Hx of Psychotic disorder (Schizoaffective)  4. Dementia  5. GERD    PLAN:    This patient was admitted by ED physician without doing proper work up. Spoke to dr. Micheline Davies this morning - he stated patient was supposed to go to [facility]  spoke to GI - can not place PEG until one or two weeks but can call IR  Talked to IR - needs CT abdomen pelvis, may place it next week as they have a very busy day tomorrow. However, patient needs to be cleared by surgeon  Will do IVF    Expect long stay due to complexity of issues.      CMP:   Lab Results   Component Value Date/Time     05/30/2019 05:54 AM    K 3.8 05/30/2019 05:54 AM     05/30/2019 05:54 AM    CO2 27 05/30/2019 05:54 AM    AGAP 7 05/30/2019 05:54 AM    GLU 85 05/30/2019 05:54 AM    BUN 13 05/30/2019 05:54 AM    CREA 0.81 05/30/2019 05:54 AM    GFRAA >60 05/30/2019 05:54 AM    GFRNA >60 05/30/2019 05:54 AM    CA 9.4 05/30/2019 05:54 AM    ALB 3.5 05/30/2019 05:54 AM    TP 7.6 05/30/2019 05:54 AM    GLOB 4.1 (H) 05/30/2019 05:54 AM    AGRAT 0.9 05/30/2019 05:54 AM    SGOT 18 05/30/2019 05:54 AM    ALT 13 (L) 05/30/2019 05:54 AM     CBC:   Lab Results   Component Value Date/Time    WBC 12.4 05/30/2019 05:54 AM    HGB 14.1 05/30/2019 05:54 AM    HCT 41.3 05/30/2019 05:54 AM     05/30/2019 05:54 AM

## 2019-05-30 NOTE — ED NOTES
Pt resting on stretcher. NAD noted. ABCs intact. VSS. Pt provided warm blankets. Will continue to monitor.

## 2019-05-30 NOTE — CONSULTS
Pt seen and examined. Labs reviewed. Awaiting CT results. No peritoneal signs. Still feel pt should be managed by original operating surgeon.       Consult dictated #[id number]  RP

## 2019-05-31 ENCOUNTER — HOSPICE ADMISSION (OUTPATIENT)
Dept: HOSPICE | Facility: HOSPICE | Age: 64
End: 2019-05-31
Payer: MEDICARE

## 2019-05-31 PROBLEM — R53.81 DEBILITY: Status: ACTIVE | Noted: 2019-05-31

## 2019-05-31 PROBLEM — F02.80 DEMENTIA ASSOCIATED WITH OTHER UNDERLYING DISEASE WITHOUT BEHAVIORAL DISTURBANCE (HCC): Status: ACTIVE | Noted: 2019-05-31

## 2019-05-31 PROBLEM — Z71.89 GOALS OF CARE, COUNSELING/DISCUSSION: Status: ACTIVE | Noted: 2019-05-31

## 2019-05-31 PROBLEM — E43 SEVERE PROTEIN-CALORIE MALNUTRITION (HCC): Status: ACTIVE | Noted: 2019-05-31

## 2019-05-31 LAB
ANION GAP SERPL CALC-SCNC: 7 MMOL/L (ref 3–18)
BUN SERPL-MCNC: 6 MG/DL (ref 7–18)
BUN/CREAT SERPL: 8 (ref 12–20)
CALCIUM SERPL-MCNC: 8.9 MG/DL (ref 8.5–10.1)
CHLORIDE SERPL-SCNC: 108 MMOL/L (ref 100–108)
CO2 SERPL-SCNC: 25 MMOL/L (ref 21–32)
CREAT SERPL-MCNC: 0.77 MG/DL (ref 0.6–1.3)
GLUCOSE SERPL-MCNC: 94 MG/DL (ref 74–99)
POTASSIUM SERPL-SCNC: 3.4 MMOL/L (ref 3.5–5.5)
SODIUM SERPL-SCNC: 140 MMOL/L (ref 136–145)

## 2019-05-31 PROCEDURE — 74011000258 HC RX REV CODE- 258: Performed by: INTERNAL MEDICINE

## 2019-05-31 PROCEDURE — 76450000000

## 2019-05-31 PROCEDURE — 36415 COLL VENOUS BLD VENIPUNCTURE: CPT

## 2019-05-31 PROCEDURE — 99222 1ST HOSP IP/OBS MODERATE 55: CPT | Performed by: NURSE PRACTITIONER

## 2019-05-31 PROCEDURE — 99218 HC RM OBSERVATION: CPT

## 2019-05-31 PROCEDURE — 92526 ORAL FUNCTION THERAPY: CPT

## 2019-05-31 PROCEDURE — 92610 EVALUATE SWALLOWING FUNCTION: CPT

## 2019-05-31 PROCEDURE — 74011250636 HC RX REV CODE- 250/636: Performed by: HOSPITALIST

## 2019-05-31 PROCEDURE — 65270000029 HC RM PRIVATE

## 2019-05-31 PROCEDURE — 80048 BASIC METABOLIC PNL TOTAL CA: CPT

## 2019-05-31 PROCEDURE — 74011250637 HC RX REV CODE- 250/637: Performed by: INTERNAL MEDICINE

## 2019-05-31 PROCEDURE — 74011000258 HC RX REV CODE- 258: Performed by: HOSPITALIST

## 2019-05-31 PROCEDURE — 74011250636 HC RX REV CODE- 250/636: Performed by: INTERNAL MEDICINE

## 2019-05-31 PROCEDURE — 74011250637 HC RX REV CODE- 250/637: Performed by: HOSPITALIST

## 2019-05-31 RX ORDER — FAMOTIDINE 20 MG/1
20 TABLET, FILM COATED ORAL 2 TIMES DAILY
Status: DISCONTINUED | OUTPATIENT
Start: 2019-05-31 | End: 2019-06-03

## 2019-05-31 RX ORDER — DOCUSATE SODIUM 100 MG/1
100 CAPSULE, LIQUID FILLED ORAL DAILY
Status: DISCONTINUED | OUTPATIENT
Start: 2019-05-31 | End: 2019-06-04 | Stop reason: HOSPADM

## 2019-05-31 RX ORDER — FACIAL-BODY WIPES
10 EACH TOPICAL ONCE
Status: DISCONTINUED | OUTPATIENT
Start: 2019-05-31 | End: 2019-05-31

## 2019-05-31 RX ADMIN — MEMANTINE 5 MG: 10 TABLET ORAL at 18:34

## 2019-05-31 RX ADMIN — POTASSIUM CHLORIDE: 2 INJECTION, SOLUTION, CONCENTRATE INTRAVENOUS at 12:47

## 2019-05-31 RX ADMIN — FAMOTIDINE 20 MG: 20 TABLET ORAL at 09:29

## 2019-05-31 RX ADMIN — MEMANTINE 5 MG: 10 TABLET ORAL at 09:30

## 2019-05-31 RX ADMIN — DEXTROSE MONOHYDRATE AND SODIUM CHLORIDE 75 ML/HR: 5; .9 INJECTION, SOLUTION INTRAVENOUS at 22:09

## 2019-05-31 RX ADMIN — SERTRALINE HYDROCHLORIDE 100 MG: 50 TABLET ORAL at 09:24

## 2019-05-31 RX ADMIN — CLONAZEPAM 1 MG: 0.5 TABLET ORAL at 21:26

## 2019-05-31 RX ADMIN — MIRTAZAPINE 30 MG: 15 TABLET, FILM COATED ORAL at 21:26

## 2019-05-31 RX ADMIN — ACETAMINOPHEN 650 MG: 325 TABLET ORAL at 12:50

## 2019-05-31 RX ADMIN — HEPARIN SODIUM 5000 UNITS: 5000 INJECTION INTRAVENOUS; SUBCUTANEOUS at 09:27

## 2019-05-31 RX ADMIN — DOCUSATE SODIUM 100 MG: 100 CAPSULE, LIQUID FILLED ORAL at 09:29

## 2019-05-31 RX ADMIN — HEPARIN SODIUM 5000 UNITS: 5000 INJECTION INTRAVENOUS; SUBCUTANEOUS at 21:32

## 2019-05-31 RX ADMIN — FAMOTIDINE 20 MG: 20 TABLET ORAL at 18:34

## 2019-05-31 RX ADMIN — MELATONIN TAB 3 MG 6 MG: 3 TAB at 21:26

## 2019-05-31 NOTE — HOSPICE
Met with patient and Juliana daughter at bedside. Discussed [facility] philosophy, services, criteria, and IDT. Answered all questions. Gave brochure with 24/7 contact information. Juliana does not want her father to return to [facility] and requested information on other facilities. Then she would like to speak to her  \"about everything that has happened. \" Per Dylon Navarrete, she was not notified her father was in the hospital and found out when she called NH this morning. Writer informed Dylon Navarrete her father has been approved for hospice services and to please contact [phone] once she decided on placement. Dylon Navarrete,  informed on above information. Thank you for the referral to [facility]. If we can be of further assistance please contact [phone].         NILS GraffN, RN  Nurse Liaison, [place] [address]., [address] [facility], [address] [place], [place] [address]  [phone]  [email or web address]

## 2019-05-31 NOTE — ACP (ADVANCE CARE PLANNING)
Mr Krystle Rios has AMD and other paperwork naming his daughter Truman Tate as his agent, guardian, according to Truman Tate. No copy of paperwork given to Palliative team at this time. DDNR signed. Copy placed on chart and several copies given to Truman Tate. Plan for discharge with hospice support, depending on where Juliana and her family decide is best - back at facility or her home.

## 2019-05-31 NOTE — WOUND CARE
Physical Exam   Room 504: pt has blanchable, intact redness on sacrum & right heel. Prevalon boots in place bilaterally, envision mattress on versacare frame in use. Suggest continue pressure injury prevention. Will turn over care to nursing staff at this time.   Ronan WRAYN, RN, [facility], [address]

## 2019-05-31 NOTE — CONSULTS
[address] [facility]    Name:  Lyndsey Kelsey  MR#:   [id number]  :  1955  ACCOUNT #:  [de-identified]  DATE OF SERVICE:  2019    The patient has no primary care physician. REASON FOR CONSULTATION:  Removed percutaneous endoscopically placed gastrostomy tube. HISTORY OF PRESENT ILLNESS:  The patient is a 79-year-old white male with history of dementia and schizophrenia. He had an endoscopic gastrostomy tube inserted at [facility] by Dr. Shaheen Valentine. Betsy García of Surgery Department. In reviewing his operative note, the stomach was not pexy'd or fixed to the abdominal wall. It was just secured with the gastrostomy internal bolster. By ER report, the patient removed his own gastrostomy tube at some point at the rehab facility. Due to the patient's mental status, I am unable to ascertain whether or not he is having any pain or any other complaints. No additional history is able to be obtained from the patient, only from the admission history and ER documents. When initially consulted, I recommended the patient be transferred back to [place] so that the original operating surgeon would be able to monitor and provide any additional nonsurgical or surgical care required for a complication within 24 hours of his procedure. Per report, that surgeon or whoever was covering for him refused the transfer saying that it was a bed availability issue and recommended admission for observation, serial abdominal exams to our hospital instead with no plans made to follow with his patient himself. PAST MEDICAL HISTORY:  Schizophrenia, posttraumatic stress disorder, depression, insomnia, hepatitis C, gastroesophageal reflux disease, dysphagia, dementia, asthma, anxiety, and alcohol abuse.     PAST SURGICAL HISTORY:  Some kind of peptic ulcer surgery, I do see an upper abdominal incision well healed; appendectomy; bilateral knee arthroscopy; knee replacement and the above-mentioned endoscopic gastrostomy tube dated 05/29/2019. MEDICATIONS:  Please see med rec sheet. In reviewing the ones we have on chart, I do not see that he is on any anticoagulation or anticoagulant medications. ALLERGIES:  NO KNOWN DRUG ALLERGIES. REVIEW OF SYSTEMS:  Not able to be obtained from the patient. FAMILY HISTORY:  Unable to be obtained. PHYSICAL EXAMINATION:  GENERAL:  He is elderly, somewhat cachectic in appearance, no acute distress. VITAL SIGNS:  He has been afebrile since arrival.  Last temperature this morning at 8:38 was 98, blood pressure 110/64, respirations last one taken at 8:38 this morning 16. He is saturating between 97% and 100% on room air. HEENT:  Normocephalic, atraumatic. Pupils equal, round, and reactive to light and accommodation. Extraocular movements intact. Sclerae anicteric. NECK:  Supple. No JVD. CARDIOVASCULAR:  Regular rate and rhythm. LUNGS:  Clear to auscultation. ABDOMEN:  Soft, nondistended. Good bowel sounds. He has some tenderness with some mild guarding right at the gastrostomy tube removal site. The site itself is clean with minimal serous fluid on the otherwise dry gauze dressing. There is no other localized tenderness. No other localized or diffuse peritoneal signs present. RECTAL:  Deferred. EXTREMITIES:  No clubbing or cyanosis. Good capillary refill. No calf tenderness. ANCILLARY STUDIES:  Labs on admission show white count within normal at 12.4, H and H 14.1 and 41.3, platelets 035. Slight left shift with 80 neutrophils. BMP is normal.  No LFT elevations. Albumin is actually normal at 3.5. No imaging results are available. The patient by report has had a CT of the abdomen and pelvis performed, but no result is available at this time.     ASSESSMENT:  A 50-year-old white male with dementia, currently one day status post percutaneous endoscopic gastrostomy tube insertion with removal at the patient's rehab facility shortly after that, now with no obvious peritoneal signs, exam maybe somewhat limited by the patient's underlying mental status. PLAN:  I do not believe any urgent surgical intervention is necessary. I do feel it is inappropriate for this patient to be essentially abandoned by his operating surgeon and left to be taken care at hospital less than 24 hours after certainly an unintended but certainly a definite surgical complication. That being said, would not feel any urgent surgical intervention is necessary right here right now. We will follow with CT results. Continue to monitor abdominal exam.  Repeat white count in morning. Should it come to needing surgical intervention, I am unsure who is responsible for this patient in terms of getting consent. We will follow with hospitalist to see if that can be ascertained. Would be hesitant to put another gastrostomy tube in, in light of the patient's almost immediate removal of the one placed yesterday unless assurances can be made that he would be able to be better restrained at his facility.       Katie Lisa MD      [id number]  D:  05/30/2019 16:41  T:  05/30/2019 17:46  JOB #:  [id number]  CC:  MD Chalino Carter MD

## 2019-05-31 NOTE — PROGRESS NOTES
Problem: Dysphagia (Adult)  Goal: *Acute Goals and Plan of Care (Insert Text)  Description  Patient will:  1. Tolerate PO trials with 0 s/s overt distress in 4/5 trials  2. Utilize compensatory swallow strategies/maneuvers (decrease bite/sip, size/rate, alt. liq/sol) with min cues in 4/5 trials  3. Perform oral-motor/laryngeal exercises to increase oropharyngeal swallow function with min cues  4. Complete an objective swallow study (i.e., MBSS) to assess swallow integrity, r/o aspiration, and determine of safest LRD, min A    Rec:     Puree with thin liquids - would benefit from nutritional supplements  Assistance with all PO  Okay for soft solid if transitioned to comfort feeds  Aspiration precautions  HOB >45 during po intake, remain >30 for 30-45 minutes after po   Small bites/sips; alternate liquid/solid with slow feeding rate   Oral care TID  Meds crushed         Outcome: Progressing Towards Goal    SPEECH LANGUAGE PATHOLOGY BEDSIDE SWALLOW EVALUATION/TREATMENT    Patient: Pratik Plummer (70 y.o. male)  Date: 5/31/2019  Primary Diagnosis: PEG tube malfunction (HCC) [K83.38]        Precautions: aspiration     PLOF: As per H&P    ASSESSMENT :  Based on the objective data described below, the patient presents with mod oral dysphagia. Pt tolerated mech soft solid, puree, thin liquids + straw, and med pass from RN in puree with thin liquid wash without any overt s/sx of aspiration. Significantly decreased orolingual strength/ROM observed with labored bolus mastication and delayed swallow initiation across all trials. Pt with meds in mouth post swallow requiring mod-max cues to clear. He was able to clear mech soft with labored mastication. Suspect poor endurance with all PO presentations resulting in limited nutrition/hydration. Rec puree diet with thin liquids, aspiration precautions, oral care TID, and meds crushed. Rec assistance with all feeds.  He would benefit from nutritional supplements and may benefit from an alternate means of nutrition/hydration if limited intake is observed during hospital course. Family is currently deciding on comfort care; would initiate soft solid with thin liquids comfort care is decided upon by family. ST will continue to follow. TREATMENT :  Skilled therapy initiated. Per daughter, pt was on honey-thickened liquids at nursing home. She stated she was not aware of any SLP services or completion of MBS in the past. No MBS viewed during chart review. Educated pt and daughter on aspiration precautions and importance of compensatory swallow techniques to decrease aspiration risk (decrease rate of intake & sip/bite size, upright @HOB for all po intake and ~30 minutes after po); family verbalized comprehension. Patient will benefit from skilled intervention to address the above impairments. Patient's rehabilitation potential is considered to be Guarded  Factors which may influence rehabilitation potential include:   ?            Mental ability/status  ? Medical condition     PLAN :  Recommendations and Planned Interventions: See above  Frequency/Duration: Patient will be followed by speech-language pathology 1-2 times per day/3-5 days per week to address goals. Discharge Recommendations: [place] and To Be Determined     SUBJECTIVE:   Patient aphonic throughout evaluation.      OBJECTIVE:     Past Medical History:   Diagnosis Date    Alcohol abuse     in remission    Allergic rhinitis     Anxiety     Asthma     Dementia     Difficulty walking     Dysphagia     Esophageal obstruction     GERD (gastroesophageal reflux disease)     Hepatitis, chronic (HCC)     Hepatitis C    Insomnia     Major depression     Muscle weakness     Psychosis (HCC)     PTSD (post-traumatic stress disorder)     PUD (peptic ulcer disease)     Schizoaffective disorder, depressive type (HCC)     Seborrheic dermatitis     Vitamin D deficiency     Weight loss      Past Surgical History:   Procedure Laterality Date    ABDOMEN SURGERY PROC UNLISTED      Peptic ulcer surgery    HX APPENDECTOMY      HX KNEE ARTHROSCOPY      Both knees    HX KNEE REPLACEMENT Right      Prior Level of Function/Home Situation: see below  Home Situation  Home Environment: Rehabilitation facility  One/Two Story Residence: One story  Living Alone: No  Support Systems: Skilled nursing facility, Child(robert)  Patient Expects to be Discharged to[de-identified] Other (comment)(home with daughter)  Current DME Used/Available at Home: Wheelchair    Diet prior to admission: puree with honey-thick liquids per pt's daughter  Current Diet:  puree with thin     Cognitive and Communication Status:  Neurologic State: Alert  Orientation Level: Oriented to person  Cognition: Follows commands  Oral Assessment:  Oral Assessment  Labial: No impairment  Dentition: Edentulous(poor fitting dentures per daughter)  Oral Hygiene: adequate  Lingual: Decreased rate;Decreased strength; Incoordinated  Velum: No impairment  Mandible: No impairment  P.O. Trials:  Patient Position: 55 at [facility]  Vocal quality prior to P.O.: Aphonic  Consistency Presented: Thin liquid;Puree;Pill/Tablet;Mechanical soft  How Presented: SLP-fed/presented;Cup/sip;Spoon;Straw  Bolus Acceptance: No impairment  Bolus Formation/Control: Impaired  Type of Impairment: Delayed;Mastication;Poor;Posterior  Propulsion: Delayed (# of seconds); Discoordination;Lingual tremors  Oral Residue: Greater than 50% of bolus(pill intake)  Initiation of Swallow: Delayed (# of seconds)  Laryngeal Elevation: Weak;Decreased  Aspiration Signs/Symptoms: None  Pharyngeal Phase Characteristics: Suspected pharyngeal residue;Poor endurance  Effective Modifications: Small sips and bites; Alternate liquids/solids  Cues for Modifications: Moderate-maximal     Oral Phase Severity: Moderate  Pharyngeal Phase Severity : No impairment    PAIN:  Start of Eval: 0  End of Eval: 0     After treatment:   ? Patient left in no apparent distress sitting up in chair  ? Patient left in no apparent distress in bed  ? Call bell left within reach  ? Nursing notified  ? Family present  ? Caregiver present  ? Bed alarm activated    COMMUNICATION/EDUCATION:   ?            Aspiration precautions; swallow safety; compensatory techniques. ?            Patient/family have participated as able in goal setting and plan of care.     Thank you for this referral.    Jhonatan Vila M.S. CCC-SLP/L  Speech-Language Pathologist

## 2019-05-31 NOTE — PROGRESS NOTES
NUTRITION    Nursing Referral: MST, EN/PN PTA      RECOMMENDATIONS / PLAN:     - Add supplements: Ensure Enlive, BID & Magic Cup, once daily.  - PEG replacement pending goals of care. Nutrition available for EN recommendations as needed. - Continue IV fluids until oral intake adequate. - Continue RD inpatient monitoring and evaluation. NUTRITION INTERVENTIONS & DIAGNOSIS:     [x] Meals/snacks: modified composition  [x] Medical food supplement therapy: initiate  [x] Enteral Nutrition: pending goals of care  [x] IV fluids: D5 NS at 75 mL/hr (90 gm dextrose, 306 kcal per day)    Nutrition Diagnosis:  Inadequate energy intake related to mentation and PEG tube dislodgement as evidenced by poor oral intake, now without EN access. Underweight related to predicted prolonged inadequate oral intake as evidenced by BMI 16.13 kg/m^2    Patient meets criteria for Severe Protein Calorie Malnutrition as evidenced by:   ASPEN Malnutrition Criteria  Acute Illness, Chronic Illness, or Social/Enviornmental: Chronic illness  Body Fat: Severe(buccal, triceps)  Muscle Mass: Severe(calves, temples)  ASPEN Malnutrition Score - Chronic Illness: 12  Chronic Illness - Malnutrition Diagnosis: Severe malnutrition. ASSESSMENT:     5/31: Surgery signed off no peritoneal signs, started on diet per SLP today. Bites/sips with encouragement and assistance from family today. Palliative following and awaiting goals of care discussion regarding PEG reinsertion. Hospice consulted. Constipation noted on CT.    5/30: Pt alert, dementia; not answering questions. Did denied having any discomfort at this time. Pt admitted for dislodged PEG tube; noted removed by patient. GI unable to replace PEG. Noted consult to IR for possible replacement. NFPE conducted. Noted pt had PEG placed on 5/29/19 due to poor po intake; does not have dysphagia per H&P.  Admitted from rehab facility    Average po intake/ EN infusion adequate to meet patients estimated nutritional needs:   [] Yes     [x] No   [] Unable to determine at this time    Diet: DIET DYSPHAGIA PUREED (NDD1)      Food Allergies:  NKFA  Current Appetite:   [] Good     [] Fair     [x] Poor     [x] Other: mentation  Appetite/meal intake prior to admission:   [] Good     [] Fair     [] Poor     [x] Other: unknown  Feeding Limitations:  [] Swallowing difficulty    [] Chewing difficulty    [x] Other: dementia  Current Meal Intake:   Patient Vitals for the past 100 hrs:   % Diet Eaten   05/31/19 1332 50 %   05/31/19 0851 0 %       BM: 5/30- formed  Skin Integrity: stage 1 pressure injury to sacrum & right heel, abdominal incision (PEG site)  Edema:   [x] No     [] Yes   Pertinent Medications: Reviewed: colace, pepcid, remeron, zofran, KCL     Recent Labs     05/31/19  0440 05/30/19  0554    137   K 3.4* 3.8    103   CO2 25 27   GLU 94 85   BUN 6* 13   CREA 0.77 0.81   CA 8.9 9.4   ALB  --  3.5   SGOT  --  18   ALT  --  13*       Intake/Output Summary (Last 24 hours) at 5/31/2019 1511  Last data filed at 5/31/2019 1332  Gross per 24 hour   Intake 1166 ml   Output    Net 1166 ml       Anthropometrics:  Ht Readings from Last 1 Encounters:   05/30/19 5' 7\" (1.702 m)     Last 3 Recorded Weights in this Encounter    05/30/19 0350   Weight: 46.7 kg (103 lb)     Body mass index is 16.13 kg/m².    Underweight     Weight History:  -33 lb (24%) x 1.5 year PTA per chart hx    Weight Metrics 5/30/2019 5/29/2019 10/26/2017   Weight 103 lb 103 lb 1 oz 136 lb   BMI 16.13 kg/m2 16.14 kg/m2 20.68 kg/m2        Admitting Diagnosis: PEG tube malfunction (HCC) [K94.23]  PEG tube malfunction (HCC) [K94.23]  Pertinent PMHx:  Alcohol abuse, dementia, dysphagia (although also noted that pt currently does not have this), GERD, esophageal obstruction, depression, PUD, vitamin D deficiency     Education Needs:        [x] None identified  [] Identified - Not appropriate at this time  []  Identified and addressed - refer to education log  Learning Limitations:   [] None identified  [x] Identified: dementia     Cultural, Rastafari & ethnic food preferences:  [x] None identified    [] Identified and addressed     ESTIMATED NUTRITION NEEDS:     +500 kcal daily for promotion of wt gain. Calories: 2132-9785 kcal (MSJx1.2-1.3) based on  [x] Actual BW: 47 kg      [] IBW   Protein: 38-56 gm (0.8-1.2 gm/kg) based on  [x] Actual BW      [] IBW   Fluid: 1 mL/kcal     MONITORING & EVALUATION:     Nutrition Goal(s):   1. Nutritional needs will be met through adequate oral intake or nutrition support within the next 7 days. Outcome:  [] Met/Ongoing    [x] Progressing towards goal    [] Not progressing towards goal    [] New/Initial goal    2. Weight gain of 1-2 lbs over the next 7 days.    Outcome:  [] Met/Ongoing    [x] Progressing towards goal    [] Not progressing towards goal    [] New/Initial goal     Monitoring:   [x] Food and beverage intake   [x] Diet order   [x] Nutrition-focused physical findings   [x] Treatment/therapy   [] Weight   [] Enteral nutrition intake        Previous Recommendations (for follow-up assessments only):     []   Implemented       []   Not Implemented (RD to address)      [] No Longer Appropriate     [x] No Recommendation Made     Discharge Planning: oral diet as tolerated, supplemental EN pending goals of care and PEG replacement  [x] Participated in care planning, discharge planning, & interdisciplinary rounds as appropriate      Clark Dangelo RD   Pager: [phone]

## 2019-05-31 NOTE — PROGRESS NOTES
Speech Therapy:     Dysphagia eval/tx completed with recs of puree and thin liquids, meds crushed. Pt okay for soft solid with thin if he transitions to comfort care. Full report to follow.      Thank you for this referral.    Aris Valiente M.S. CCC-SLP/L  Speech-Language Pathologist

## 2019-05-31 NOTE — PROGRESS NOTES
Patient had uneventful night with NAD noted,reported or voiced. Patient incontinent. Daughter to bedside approximately 2300. Reports that she id just finding out the her father was admitted. Had called prior to arrival and she informed that we could not give her information over the phone due to privacy act. Patient's admission record completed with daughter being providing medical history. Daughter also reports the patient is talking more than he usually does.

## 2019-05-31 NOTE — PROGRESS NOTES
Palliative NP's Julia Bartlett and I met with Mr Sebastian Trujillo; his daughter LILY Cool is present at bedside. Mr Sebastian Trujillo drifts in and out of sleep as we talk; he does not contribute to our conversation, occasionally opening his eyes and smiling. Compassion and listening support were offered as Juliana shared a little about her Dad and how she and her family have cared for him \"for half of my life. \" When her parents , Mr Sebastian Trujillo came to live with Juliana and her family. 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 Juliana is clear that she wants her Dad to be comfortable now and not suffer any more. He has lived in current long term care facility for the past year and she and her family will decide where hospice support will be best for him - in facility or back at home with her and her family. She does not want his care to be escalated and she wants him to be comfortable until he is ready for discharge, not returning to the hospital.    DDNR was signed and copies made. Juliana stated that she has all paperwork for her Dad as his agent. No spiritual issues or concerns were shared at this time. Juliana states that she is a believer. Juliana was encouraged to connect her brother with her Dad by phone. According to [employer], her brother travels a lot. Juliana expressed her gratitude for visit, information and support. Contact information was shared and team will continue to follow up as she and her family make decision about hospice support.

## 2019-05-31 NOTE — PROGRESS NOTES
Reason for Admission:  PEG tube malfunction ([facility] [K94.23]  PEG tube malfunction ([facility] [K94.23]                 RRAT Score:   14           Plan for utilizing home health:   None anticipated. Likelihood of Readmission:   Moderate                         Do you (patient/family) have any concerns for transition/discharge? yes   Family does not want pt to return to [facility]. Transition of Care Plan:       Initial assessment completed with patient. Cognitive status of patient: Pt is alert and respond to name only. Pt just looks around. Face sheet information confirmed:  yes. Rock Chacon, daughter, provided  needed information. This patient was living at [facility] for a year per [employer] as LTC. Juliana stated \" I did not know that my father was in the hospital until I call the facility. I don't want him to go back there. \"  Prior to hospitalization, patient was considered to be independent with ADLs/IADLS : no . If not independent,  patient needs assist with : dressing, bathing, food preparation, toileting and grooming. Pt is complete care and can be transferred to wheelchair. Patient has a current ACP document on file: DNR status    The patient will need stretcher  transport  upon discharge. The patient  has no  medical equipment available in the home. Patient is not currently active with home health. Patient has stayed in a skilled nursing facility or rehab. Was  stay within last 60 days : yes. This patient is on dialysis :no    List of available SNF/LTC agencies were provided and reviewed with the patient prior to discharge. Freedom of choice signed: yes, for [facility], [facility], and [facility] and placed in [facility]. Currently, the discharge plan is LTC with hospice. Per [employer], plan is for LTC with hospice.   .    Patient's current insurance is Medicare part A & B and medicaid      Care Management Interventions  PCP Verified by CM: Yes  Mode of Transport at Discharge: BLS  Transition of Care Consult (CM Consult): Discharge Planning  MyChart Signup: No  Discharge Durable Medical Equipment: No  Physical Therapy Consult: No  Occupational Therapy Consult: No  Speech Therapy Consult: No  Current Support Network:  Other(LTC)  Plan discussed with Pt/Family/Caregiver: Yes  Discharge Location  Discharge Placement: 1 VALE Pedroza, RN  Pager # [phone]  Care Manager

## 2019-05-31 NOTE — PROGRESS NOTES
SUBJECTIVE:    Patient denies for having abdominal pain or chest pain. No SOB. Had a BM per nursing chart. Daughter is at bedside: patient was at nursing home for one year, not eating well and losing weight so they decided to proceed for PEG placement. Patient can eat certain type of food per her. Patient has not walked for a while. OBJECTIVE:    /70 (BP 1 Location: Left arm, BP Patient Position: At rest)   Pulse 88   Temp 97.8 °F (36.6 °C)   Resp 14   Ht 5' 7\" (1.702 m)   Wt 46.7 kg (103 lb)   SpO2 91%   BMI 16.13 kg/m²     General appearance - awake, NAD  Chest - good air entry noted in bases, no wheezes  Heart - S1 and S2 normal  Abdomen - soft, nontender, nondistended, Bowel sounds present, PEG site dressing +  Neurological - awake, moves all extremities well  Extremities - no pedal edema noted    ASSESSMENT:    1. Dislodged PEG Tube  2. Severe protein Malnutrition  3. Hx of Psychotic disorder (Schizoaffective)  4. Dementia  5. GERD    PLAN:    CT abdomen pelvis report reviewed - small air as expected per dr. Ochoa Trejo, no intra-abdominal fluid. No clinical peritonitis   Surgery/IR to follow for PEG replacement  Speech to evaluate him   D/w palliative care team - daughter is open about hospice care.    Replace K  Cont current management    CMP:   Lab Results   Component Value Date/Time     05/31/2019 04:40 AM    K 3.4 (L) 05/31/2019 04:40 AM     05/31/2019 04:40 AM    CO2 25 05/31/2019 04:40 AM    AGAP 7 05/31/2019 04:40 AM    GLU 94 05/31/2019 04:40 AM    BUN 6 (L) 05/31/2019 04:40 AM    CREA 0.77 05/31/2019 04:40 AM    GFRAA >60 05/31/2019 04:40 AM    GFRNA >60 05/31/2019 04:40 AM    CA 8.9 05/31/2019 04:40 AM     CBC:   Lab Results   Component Value Date/Time    WBC 8.2 05/30/2019 04:50 PM    HGB 12.0 (L) 05/30/2019 04:50 PM    HCT 35.7 (L) 05/30/2019 04:50 PM     05/30/2019 04:50 PM

## 2019-05-31 NOTE — PHYSICIAN ADVISORY
Letter of Admission Status Determination: Upgrade to Inpatient     Reg Montenegro was hospitalized as observation status on 5/30/2019. Since Mr. Reg Montenegro is expected to need at least two midnights of medically necessary hospital care, he will meet the benchmark for Inpatient Admission. Based on the documented clinical condition and care plan, we recommend upgrading patient's hospitalization status to INPATIENT. The final decision regarding the patient's hospitalization status depends on the attending physician's clinical judgment.        Nicolasa Sadler MD, [place], [address]  [phone]

## 2019-05-31 NOTE — CONSULTS
Palliative Medicine Consult    Patient Name: Susan Allan  YOB: 1955    Date of Initial Consult: 05/31/2019  Reason for Consult: Goals of care discussion   Requesting Provider: Enrike Meza MD   Primary Care Physician: Kaia Mahmood MD      SUMMARY:   Susan Allan is a 58 y.o. with a past history of schizoaffective disorder, dementia, GERD, and malnutrition, who was admitted on 5/30/2019 from [facility] with a diagnosis of dislodged peg tube, severe protein malnutrition, and dementia. Current medical issues leading to Palliative Medicine involvement include: goals of care discussion, dislodged PEG tube, severe protein malnutrition, and dementia. PALLIATIVE DIAGNOSES:   1. Goals of care discussion  2. PEG tube malfunction  3. Severe protein malnutrition  4. Dementia  5. Debility         PLAN:   1. Goals of Care Discussion: Palliative Medicine team including Mandeep Swanson, NP, Misa Hagen, , and myself met with Lázaro Ghotra (Daughter) at patient's bedside. Patient asleep, but arousable to verbal stimuli. Patient does not follow command. He is nonverbal. Daughter states patient has one other child, a son who travels and is not involved in the care of his father, and defers all decision making to daughter. Per daughter, patient was residing in a LTC facility for approximately a year when his appetite started to decline and had lost 50 lb in the last four months. Decided on PEG tube which was inserted 5/29/2019 so that \"he would not starve to death\", he was then discharged to rehab, but unfortunately patient pulled out the Peg tube. Daughter states that patient would not want to live this way. She stated she wanted to see if the PEG would work and it didn't. Discussed dementia progression and the benefits and burdens of aggressive therapies and tube feedings versus a comfort care approach.  Discussed the benefits and burdens of CPR (including chest compressions, electrical shock, and intubation) in the event of cardiac or pulmonary arrest. Daughter wishes for DNR/DNI, with no escalation of care. DDNR signed and copy placed in chart. She wishes to discuss with family to make final decision regarding feeding tubes, but she personally does not want patient to have another feeding tube, due to associated risks. Discussed interest in comfort measures with hospice, consult placed for informational purposes. At this time, continue current care with DNR/DNI, with no escalation of care. Await daughter to talk with family to make final decision on feeding tube reinsertion. Discussed plan with Dr. Tucker Matta. 2. PEG tube malfunction: Inserted 5/29/19 and self dislodged at rehab facility 5/30/19. Currently being monitored for peritonitis. Discussions to possibly reinsert if no complications arise from dislodging, but daughter leaning more toward comfort care without feeding tubes. She wants to talk to her family before making the final decision. 3. Severe protein malnutrition: Attempted PEG tube, see number 2. On IVF at present time. Remains NPO. 4. Dementia: Severe, nonverbal, does not follow command. Discussed disease progression with daughter. 5. Debility: recent functional decline in the past year. Went to live in [address] [place] [address] about a year ago due to decline in functional status. Continues to decline per daughter. Currently lying in bed and has not been up out of bed since admission. 6. Initial consult note routed to primary continuity provider  7. Communicated plan of care with: Palliative IDT, daughter, Dr. Cele Farrar / TREATMENT PREFERENCES:   [====Goals of Care====]  GOALS OF CARE:DNR/DNI, with no escalation of care. Await daughter to talk with family to make final decision on feeding tube reinsertion.      Patient/Health Care Proxy Stated Goals: Prolong life      TREATMENT PREFERENCES:   Code Status: DNR    Advance Care Planning:  Advance Care Planning 5/31/2019   Patient's Healthcare Decision Maker is: Legal Next of Kin   Confirm Advance Directive Yes, not on file   Patient Would Like to Complete Advance Directive Unable   Does the patient have other document types Do Not Resuscitate       Medical Interventions: Limited additional interventions      The palliative care team has discussed with patient / health care proxy about goals of care / treatment preferences for patient.  [====Goals of Care====]         HISTORY:     History obtained from: DaughterLuanne    CHIEF COMPLAINT: n/a    HPI/SUBJECTIVE:    The patient is:   [] Verbal and participatory  [x] Non-participatory due to: nonverbal, dementia    Clinical Pain Assessment (nonverbal scale for severity on nonverbal patients): Adult Nonverbal Pain Scale  Face: No particular expression or smile  Activity (Movement): Lying quietly, normal position  Guarding: Lying quietly, no positioning of hands over areas of body  Physiology (Vital Signs): Stable vital signs  Respiratory: Baseline RR/SpO2 compliant with ventilator  Total Score: 0       FUNCTIONAL ASSESSMENT:     Palliative Performance Scale (PPS):  PPS: 20       PSYCHOSOCIAL/SPIRITUAL SCREENING:     Advance Care Planning:  Advance Care Planning 5/31/2019   Patient's Healthcare Decision Maker is: Legal Next of Kin   Confirm Advance Directive Yes, not on file   Patient Would Like to Complete Advance Directive Unable   Does the patient have other document types Do Not Resuscitate        Any spiritual / Orthodoxy concerns:  [] Yes /  [x] No    Caregiver Burnout:  [] Yes /  [x] No /  [] No Caregiver Present      Anticipatory grief assessment:   [x] Normal  / [] Maladaptive              REVIEW OF SYSTEMS:     Positive and pertinent negative findings in ROS are noted above in HPI. The following systems were [] reviewed / [x] unable to be reviewed as noted in HPI  Other findings are noted below.   Systems: constitutional, ears/nose/mouth/throat, respiratory, gastrointestinal, genitourinary, musculoskeletal, integumentary, neurologic, psychiatric, endocrine. Positive findings noted below. Modified ESAS Completed by: provider               Anxiety: 0                   Stool Occurrence(s): 1        PHYSICAL EXAM:     From RN flowsheet:  Wt Readings from Last 3 Encounters:   05/30/19 46.7 kg (103 lb)   05/29/19 46.7 kg (103 lb 1 oz)   10/26/17 61.7 kg (136 lb)     Blood pressure 114/75, pulse 68, temperature 97.9 °F (36.6 °C), resp. rate 18, height 5' 7\" (1.702 m), weight 46.7 kg (103 lb), SpO2 92 %. Pain Scale 1: Adult Nonverbal Pain Scale  Pain Intensity 1: 0                 Last bowel movement, if known: unknown    Constitutional: Alert, arouses to verbal stimuli, NAD  Eyes: pupils equal, anicteric  ENMT: no nasal discharge  Respiratory: breathing not labored, symmetric  Musculoskeletal: no deformity  Skin: warm, dry  Neurologic: does not follow commands, nonverbal         HISTORY:     Active Problems:    PEG tube malfunction ([facility] [address] (5/30/2019)      Past Medical History:   Diagnosis Date    Alcohol abuse     in remission    Allergic rhinitis     Anxiety     Asthma     Dementia     Difficulty walking     Dysphagia     Esophageal obstruction     GERD (gastroesophageal reflux disease)     Hepatitis, chronic (HCC)     Hepatitis C    Insomnia     Major depression     Muscle weakness     Psychosis (HCC)     PTSD (post-traumatic stress disorder)     PUD (peptic ulcer disease)     Schizoaffective disorder, depressive type (HCC)     Seborrheic dermatitis     Vitamin D deficiency     Weight loss       Past Surgical History:   Procedure Laterality Date    ABDOMEN SURGERY PROC UNLISTED      Peptic ulcer surgery    HX APPENDECTOMY      HX KNEE ARTHROSCOPY      Both knees    HX KNEE REPLACEMENT Right       No family history on file. History reviewed, no pertinent family history.   Social History     Tobacco Use    Smoking status: Former Smoker Last attempt to quit: 10/25/2010     Years since quittin.6    Smokeless tobacco: Never Used   Substance Use Topics    Alcohol use: Not Currently     Allergies   Allergen Reactions    Other Plant, Animal, Environmental Unknown (comments)     CATS      Current Facility-Administered Medications   Medication Dose Route Frequency    famotidine (PEPCID) tablet 20 mg  20 mg Oral BID    potassium chloride 10 mEq, lidocaine (PF) (XYLOCAINE) 10 mg/mL (1 %) 1 mL in 0.9% sodium chloride 100 mL IVPB   IntraVENous ONCE    docusate sodium (COLACE) capsule 100 mg  100 mg Oral DAILY    clonazePAM (KlonoPIN) tablet 1 mg  1 mg Oral QHS    melatonin tablet 6 mg  6 mg Oral QHS    memantine (NAMENDA) tablet 5 mg  5 mg Oral BID    mirtazapine (REMERON) tablet 30 mg  30 mg Oral QHS    sertraline (ZOLOFT) tablet 100 mg  100 mg Oral DAILY    dextrose 5% and 0.9% NaCl infusion  75 mL/hr IntraVENous CONTINUOUS    acetaminophen (TYLENOL) tablet 650 mg  650 mg Oral Q4H PRN    heparin (porcine) injection 5,000 Units  5,000 Units SubCUTAneous Q12H    albuterol-ipratropium (DUO-NEB) 2.5 MG-0.5 MG/3 ML  3 mL Nebulization Q4H PRN    ondansetron (ZOFRAN) injection 4 mg  4 mg IntraVENous Q4H PRN          LAB AND IMAGING FINDINGS:     Lab Results   Component Value Date/Time    WBC 8.2 2019 04:50 PM    HGB 12.0 (L) 2019 04:50 PM    PLATELET 008  04:50 PM     Lab Results   Component Value Date/Time    Sodium 140 2019 04:40 AM    Potassium 3.4 (L) 2019 04:40 AM    Chloride 108 2019 04:40 AM    CO2 25 2019 04:40 AM    BUN 6 (L) 2019 04:40 AM    Creatinine 0.77 2019 04:40 AM    Calcium 8.9 2019 04:40 AM    Magnesium 2.2 2018 01:54 AM      Lab Results   Component Value Date/Time    AST (SGOT) 18 2019 05:54 AM    Alk.  phosphatase 90 2019 05:54 AM    Protein, total 7.6 2019 05:54 AM    Albumin 3.5 2019 05:54 AM    Globulin 4.1 (H) 2019 05:54 AM     Lab Results   Component Value Date/Time    INR 1.5 (H) 02/21/2012 04:00 AM    Prothrombin time 17.4 (H) 02/21/2012 04:00 AM    aPTT 45.7 (H) 02/21/2012 04:00 AM      No results found for: IRON, FE, TIBC, IBCT, PSAT, FERR   No results found for: PH, PCO2, PO2  No components found for: [place]   Lab Results   Component Value Date/Time    CK 68 05/16/2018 01:54 AM    CK - MB <1.0 05/16/2018 01:54 AM                Total time: 50 minutes  Counseling / coordination time, spent as noted above: 40 minutes  > 50% counseling / coordination?: yes, patient, daughter, Dr. Rafy Whitmore    Prolonged service was provided for  []30 min   []75 min in face to face time in the presence of the patient, spent as noted above. Time Start:   Time End:   Note: this can only be billed with 27504 (initial) or 47928 (follow up). If multiple start / stop times, list each separately.

## 2019-05-31 NOTE — PROGRESS NOTES
Progress Note    Patient: Pratik Plummer MRN: [id number]  SSN: [id number]    YOB: 1955  Age: 58 y.o. Sex: male      Admit Date: 2019    * No surgery found *    Procedure:      Subjective:     Patient has no new complaints. No new issues overnight. Objective:     Visit Vitals  /75 (BP 1 Location: Left arm, BP Patient Position: At rest)   Pulse 68   Temp 97.9 °F (36.6 °C)   Resp 18   Ht 5' 7\" (1.702 m)   Wt 46.7 kg (103 lb)   SpO2 92%   BMI 16.13 kg/m²       Temp (24hrs), Av.8 °F (36.6 °C), Min:97.6 °F (36.4 °C), Max:97.9 °F (36.6 °C)      Physical Exam:    General:  NAD. Lungs:   Clear to auscultation bilaterally. Heart:  Regular rate and rhythm. Abdomen:   Soft, non-tender. Bowel sounds normal. No peritoneal signs. Gastrostomy skin incision c/d   Extremities: Extremities normal, atraumatic, no cyanosis or edema. Data Review: images and reports reviewed. CT - some expected FA, no fluid. Lab Review: All lab results for the last 24 hours reviewed. WBC nl    Assessment:     Hospital Problems  Date Reviewed: 10/26/2017          Codes Class Noted POA    Goals of care, counseling/discussion ICD-10-CM: Z71.89  ICD-9-CM: V65.49  2019 Unknown        Severe protein-calorie malnutrition ([facility] ICD-10-CM: E43  ICD-9-CM: 262  2019 Unknown        Dementia associated with other underlying disease without behavioral disturbance ICD-10-CM: F02.80  ICD-9-CM: 294.10  2019 Unknown        Debility ICD-10-CM: R53.81  ICD-9-CM: 799.3  2019 Unknown        PEG tube malfunction ([facility] ICD-10-CM: R42.54  ICD-9-CM: 536.42  2019 Unknown              Plan/Recommendations/Medical Decision Making:     Continue present treatment. Cont NPO for now. Start clears in am (if ok with speech/swallow), then advance to whatever diet speech recommends. Will sign off for now -- please re-consult prn.

## 2019-05-31 NOTE — PROGRESS NOTES
Patients is lying in bed alert awake but pleasantly confused. Family members at bed side. No s/s of distress.

## 2019-06-01 LAB — POTASSIUM SERPL-SCNC: 3.4 MMOL/L (ref 3.5–5.5)

## 2019-06-01 PROCEDURE — 74011000258 HC RX REV CODE- 258: Performed by: INTERNAL MEDICINE

## 2019-06-01 PROCEDURE — 74011250637 HC RX REV CODE- 250/637: Performed by: INTERNAL MEDICINE

## 2019-06-01 PROCEDURE — 65270000029 HC RM PRIVATE

## 2019-06-01 PROCEDURE — 74011250636 HC RX REV CODE- 250/636: Performed by: INTERNAL MEDICINE

## 2019-06-01 PROCEDURE — 74011250636 HC RX REV CODE- 250/636: Performed by: HOSPITALIST

## 2019-06-01 PROCEDURE — 84132 ASSAY OF SERUM POTASSIUM: CPT

## 2019-06-01 PROCEDURE — 74011250637 HC RX REV CODE- 250/637: Performed by: HOSPITALIST

## 2019-06-01 PROCEDURE — 36415 COLL VENOUS BLD VENIPUNCTURE: CPT

## 2019-06-01 RX ORDER — LIDOCAINE HYDROCHLORIDE 10 MG/ML
0.3 INJECTION, SOLUTION EPIDURAL; INFILTRATION; INTRACAUDAL; PERINEURAL
Status: DISCONTINUED | OUTPATIENT
Start: 2019-06-01 | End: 2019-06-01

## 2019-06-01 RX ADMIN — MELATONIN TAB 3 MG 6 MG: 3 TAB at 21:29

## 2019-06-01 RX ADMIN — MEMANTINE 5 MG: 10 TABLET ORAL at 18:31

## 2019-06-01 RX ADMIN — MIRTAZAPINE 30 MG: 15 TABLET, FILM COATED ORAL at 21:08

## 2019-06-01 RX ADMIN — SERTRALINE HYDROCHLORIDE 100 MG: 50 TABLET ORAL at 08:54

## 2019-06-01 RX ADMIN — POTASSIUM CHLORIDE: 2 INJECTION, SOLUTION, CONCENTRATE INTRAVENOUS at 11:25

## 2019-06-01 RX ADMIN — MEMANTINE 5 MG: 10 TABLET ORAL at 08:54

## 2019-06-01 RX ADMIN — HEPARIN SODIUM 5000 UNITS: 5000 INJECTION INTRAVENOUS; SUBCUTANEOUS at 08:54

## 2019-06-01 RX ADMIN — CLONAZEPAM 1 MG: 0.5 TABLET ORAL at 21:07

## 2019-06-01 RX ADMIN — DOCUSATE SODIUM 100 MG: 100 CAPSULE, LIQUID FILLED ORAL at 08:54

## 2019-06-01 RX ADMIN — HEPARIN SODIUM 5000 UNITS: 5000 INJECTION INTRAVENOUS; SUBCUTANEOUS at 21:08

## 2019-06-01 RX ADMIN — FAMOTIDINE 20 MG: 20 TABLET ORAL at 08:54

## 2019-06-01 RX ADMIN — FAMOTIDINE 20 MG: 20 TABLET ORAL at 18:31

## 2019-06-01 NOTE — PROGRESS NOTES
Patient remains pleasantly confused.  Talks and carries on conversations when he wants to and other times he will just stare at staff

## 2019-06-01 NOTE — ROUTINE PROCESS
Bedside and Verbal shift change report given to S. [address] (oncoming nurse) by Divya Sánchez RN (offgoing nurse). Report included the following information SBAR, Kardex and MAR.

## 2019-06-01 NOTE — PROGRESS NOTES
SUBJECTIVE:    Patient denies for having abdominal pain or chest pain. No new issues other than having low appetite per nursing. OBJECTIVE:    /83 (BP 1 Location: Left arm, BP Patient Position: At rest)   Pulse 89   Temp 97.4 °F (36.3 °C)   Resp 16   Ht 5' 7\" (1.702 m)   Wt 46.8 kg (103 lb 3.2 oz)   SpO2 94%   BMI 16.16 kg/m²     General appearance - awake, NAD  Chest - good air entry noted in bases, no wheezes  Heart - S1 and S2 normal  Abdomen - soft, nontender, nondistended, Bowel sounds present, PEG site dressing +  Neurological - awake, moves all extremities well  Extremities - no pedal edema noted    ASSESSMENT:    1. Dislodged PEG Tube  2. Severe protein Malnutrition  3. Hx of Psychotic disorder (Schizoaffective)  4. Dementia  5. GERD  6. Hypokalemia  7. Failure to thrive     PLAN:    Cont current management  Replace K  Palliative care input noted  D/W DAUGHTER OVER THE PHONE @ [phone]: Wants nursing home with hospice care.     to follow    Disposition plan:  LTC with hospice care on Monday     CMP:   Lab Results   Component Value Date/Time    K 3.4 (L) 06/01/2019 05:20 AM     CBC:   No results found for: WBC, HGB, HGBEXT, HCT, HCTEXT, PLT, PLTEXT, HGBEXT, HCTEXT, PLTEXT

## 2019-06-01 NOTE — ROUTINE PROCESS
Bedside and Verbal shift change report given by Neda Saeed RN (offgoing nurse) to Gabe Toney RN (oncoming nurse).  Report included the following information SBAR, Kardex and [facility]

## 2019-06-01 NOTE — ROUTINE PROCESS
Bedside and Verbal shift change report given to Pilar Acosta RN (oncoming nurse) by Tino Justice RN (offgoing nurse). Report included the following information SBAR, Kardex, Intake/Output and MAR.

## 2019-06-01 NOTE — PROGRESS NOTES
Patient remains pleasantly confused, is verbal when he wants to be, noted to be impulsive around needs to be dried or he has soiled self. Other wise enjoys music or TV Tolerating IV fluids without difficulty. Had an uneventful night with NAD noted, reported or voiced.

## 2019-06-02 LAB — POTASSIUM SERPL-SCNC: 3.3 MMOL/L (ref 3.5–5.5)

## 2019-06-02 PROCEDURE — 74011250637 HC RX REV CODE- 250/637: Performed by: HOSPITALIST

## 2019-06-02 PROCEDURE — 84132 ASSAY OF SERUM POTASSIUM: CPT

## 2019-06-02 PROCEDURE — 74011250637 HC RX REV CODE- 250/637: Performed by: INTERNAL MEDICINE

## 2019-06-02 PROCEDURE — 74011250636 HC RX REV CODE- 250/636: Performed by: HOSPITALIST

## 2019-06-02 PROCEDURE — 36415 COLL VENOUS BLD VENIPUNCTURE: CPT

## 2019-06-02 PROCEDURE — 65270000029 HC RM PRIVATE

## 2019-06-02 RX ADMIN — FAMOTIDINE 20 MG: 20 TABLET ORAL at 18:10

## 2019-06-02 RX ADMIN — DOCUSATE SODIUM 100 MG: 100 CAPSULE, LIQUID FILLED ORAL at 09:00

## 2019-06-02 RX ADMIN — CLONAZEPAM 1 MG: 0.5 TABLET ORAL at 22:05

## 2019-06-02 RX ADMIN — MIRTAZAPINE 30 MG: 15 TABLET, FILM COATED ORAL at 22:05

## 2019-06-02 RX ADMIN — HEPARIN SODIUM 5000 UNITS: 5000 INJECTION INTRAVENOUS; SUBCUTANEOUS at 08:58

## 2019-06-02 RX ADMIN — MELATONIN TAB 3 MG 6 MG: 3 TAB at 22:05

## 2019-06-02 RX ADMIN — HEPARIN SODIUM 5000 UNITS: 5000 INJECTION INTRAVENOUS; SUBCUTANEOUS at 22:06

## 2019-06-02 RX ADMIN — MEMANTINE 5 MG: 10 TABLET ORAL at 08:59

## 2019-06-02 RX ADMIN — FAMOTIDINE 20 MG: 20 TABLET ORAL at 08:59

## 2019-06-02 RX ADMIN — SERTRALINE HYDROCHLORIDE 100 MG: 50 TABLET ORAL at 08:59

## 2019-06-02 RX ADMIN — MEMANTINE 5 MG: 10 TABLET ORAL at 18:11

## 2019-06-02 NOTE — PROGRESS NOTES
Bedside and Verbal shift change report given to JAMES Cortes (oncoming nurse) by Colt Preston. [address] (offgoing nurse). Report included the following information SBAR, Kardex, Procedure Summary, Recent Results and Med Rec Status.

## 2019-06-02 NOTE — PROGRESS NOTES
SUBJECTIVE:    Patient denies for having abdominal pain or chest pain. No new issues other than having low appetite per nursing. OBJECTIVE:    BP 96/64 (BP 1 Location: Left arm, BP Patient Position: At rest)   Pulse 77   Temp 97.1 °F (36.2 °C)   Resp 16   Ht 5' 7\" (1.702 m)   Wt 46.8 kg (103 lb 3.2 oz)   SpO2 97%   BMI 16.16 kg/m²     General appearance - awake, NAD  Chest - good air entry noted in bases, no wheezes  Heart - S1 and S2 normal  Abdomen - soft, nontender, nondistended, Bowel sounds present, PEG site dressing +  Neurological - awake, moves all extremities well  Extremities - no pedal edema noted    ASSESSMENT:    1. Dislodged PEG Tube  2. Severe protein Malnutrition  3. Hx of Psychotic disorder (Schizoaffective)  4. Dementia  5. GERD  6. Hypokalemia  7. Failure to thrive     PLAN:    Cont current management - no plans of replacing PEG tube   Repleting K  Palliative care input noted  D/W DAUGHTER OVER THE PHONE @ [phone]: Wants nursing home with hospice care.     to follow    Disposition plan:  LTC with hospice care on Monday     CMP:   Lab Results   Component Value Date/Time    K 3.3 (L) 06/02/2019 04:22 AM     CBC:   No results found for: WBC, HGB, HGBEXT, HCT, HCTEXT, PLT, PLTEXT, HGBEXT, HCTEXT, PLTEXT

## 2019-06-02 NOTE — ROUTINE PROCESS
Bedside and Verbal shift change report given by CELINA Infante, [address] (offgoing nurse) to Fredrick Reyez [address] [facility] (oncoming nurse). Report included the following information SBAR, Kardex and MAR.

## 2019-06-02 NOTE — ROUTINE PROCESS
Bedside and Verbal shift change report given to Jayna Womack RN (oncoming nurse) by Gaby Booker RN (offgoing nurse). Report included the following information SBAR, Kardex and MAR.

## 2019-06-02 NOTE — ROUTINE PROCESS
Bedside and Verbal shift change report given to [place] (oncoming nurse) by S. [address] (offgoing nurse). Report included the following information SBAR, Kardex, Procedure Summary, Recent Results and Med Rec Status.

## 2019-06-03 LAB
ERYTHROCYTE [DISTWIDTH] IN BLOOD BY AUTOMATED COUNT: 13.3 % (ref 11.6–14.5)
HCT VFR BLD AUTO: 36.2 % (ref 36–48)
HGB BLD-MCNC: 12 G/DL (ref 13–16)
MCH RBC QN AUTO: 31.1 PG (ref 24–34)
MCHC RBC AUTO-ENTMCNC: 33.1 G/DL (ref 31–37)
MCV RBC AUTO: 93.8 FL (ref 74–97)
PLATELET # BLD AUTO: 281 K/UL (ref 135–420)
PMV BLD AUTO: 10.7 FL (ref 9.2–11.8)
RBC # BLD AUTO: 3.86 M/UL (ref 4.7–5.5)
WBC # BLD AUTO: 6.4 K/UL (ref 4.6–13.2)

## 2019-06-03 PROCEDURE — 77030011256 HC DRSG MEPILEX <16IN NO BORD MOLN -A

## 2019-06-03 PROCEDURE — 92526 ORAL FUNCTION THERAPY: CPT

## 2019-06-03 PROCEDURE — 65270000029 HC RM PRIVATE

## 2019-06-03 PROCEDURE — 74011250636 HC RX REV CODE- 250/636: Performed by: HOSPITALIST

## 2019-06-03 PROCEDURE — 77030037875 HC DRSG MEPILEX <16IN BORD MOLN -A

## 2019-06-03 PROCEDURE — 85027 COMPLETE CBC AUTOMATED: CPT

## 2019-06-03 PROCEDURE — 36415 COLL VENOUS BLD VENIPUNCTURE: CPT

## 2019-06-03 PROCEDURE — 74011250637 HC RX REV CODE- 250/637: Performed by: INTERNAL MEDICINE

## 2019-06-03 PROCEDURE — 74011250637 HC RX REV CODE- 250/637: Performed by: HOSPITALIST

## 2019-06-03 RX ORDER — SODIUM CHLORIDE 9 MG/ML
250 INJECTION, SOLUTION INTRAVENOUS ONCE
Status: COMPLETED | OUTPATIENT
Start: 2019-06-03 | End: 2019-06-03

## 2019-06-03 RX ORDER — FAMOTIDINE 40 MG/5ML
20 POWDER, FOR SUSPENSION ORAL 2 TIMES DAILY
Status: DISCONTINUED | OUTPATIENT
Start: 2019-06-03 | End: 2019-06-04 | Stop reason: HOSPADM

## 2019-06-03 RX ADMIN — FAMOTIDINE 20 MG: 40 POWDER, FOR SUSPENSION ORAL at 21:18

## 2019-06-03 RX ADMIN — MEMANTINE 5 MG: 10 TABLET ORAL at 09:05

## 2019-06-03 RX ADMIN — FAMOTIDINE 20 MG: 40 POWDER, FOR SUSPENSION ORAL at 13:27

## 2019-06-03 RX ADMIN — DOCUSATE SODIUM 100 MG: 100 CAPSULE, LIQUID FILLED ORAL at 09:06

## 2019-06-03 RX ADMIN — SODIUM CHLORIDE 250 ML/HR: 900 INJECTION, SOLUTION INTRAVENOUS at 19:10

## 2019-06-03 RX ADMIN — HEPARIN SODIUM 5000 UNITS: 5000 INJECTION INTRAVENOUS; SUBCUTANEOUS at 21:18

## 2019-06-03 RX ADMIN — MIRTAZAPINE 30 MG: 15 TABLET, FILM COATED ORAL at 21:25

## 2019-06-03 RX ADMIN — SERTRALINE HYDROCHLORIDE 100 MG: 50 TABLET ORAL at 09:05

## 2019-06-03 RX ADMIN — CLONAZEPAM 1 MG: 0.5 TABLET ORAL at 21:26

## 2019-06-03 RX ADMIN — MEMANTINE 5 MG: 10 TABLET ORAL at 17:48

## 2019-06-03 RX ADMIN — MELATONIN TAB 3 MG 6 MG: 3 TAB at 21:26

## 2019-06-03 RX ADMIN — HEPARIN SODIUM 5000 UNITS: 5000 INJECTION INTRAVENOUS; SUBCUTANEOUS at 09:06

## 2019-06-03 NOTE — CDMP QUERY
Patient admitted with ***, noted to have ***. If possible, please document in progress notes and d/c summary if you are evaluating and/or treating any of the following:      ? Functional Quadriplegia  ? Other Explanation of clinical findings  ? Clinically Undetermined (no explanation for clinical findings)    The medical record reflects the following:    -----> Risk Factors: 58 yr old male with dementia    -----> Clinical Indicators:        * Per Nurses Notes, pt is total care; incontinent;        * 5-31-19 Palliative Medicine PN:  \". .. recent functional decline in the past year. Went to live in [address] [place] [address] about a year ago due to decline in functional status. Continues to decline per daughter. Currently lying in bed and has not been up out of bed since admission. .... Dementia: Severe, nonverbal, does not follow command. Jeannie Nicholson Palliative Performance Scale (PPS) 20;   [[no documentation of spoon fed. ..]]                      Treatment: ***    Please clarify and document your clinical opinion in the progress notes and discharge summary including the definitive and/or presumptive diagnosis, (suspected or probable), related to the above clinical findings. Please include clinical findings supporting your diagnosis. REFERENCE: Functional quadriplegia is the inability to use ones limbs and ambulate due to extreme debility or frailty by another medical condition (e.g. dementia, arthritis, contractures, etc.) without physical injury or damage to the spinal cord. Typically the patient requires total care.   The usual findings are: bedridden, inability to turn, unable to feed or groom self, urinary/fecal incontinence, flexion contractures.

## 2019-06-03 NOTE — ROUTINE PROCESS
Patient voided incontinent in the last 15min. Voided once this shift. IV NS bolus ordered by Rosario Maria.

## 2019-06-03 NOTE — PROGRESS NOTES
SUBJECTIVE:    Patient denies for having abdominal pain or chest pain. No new issues other than having low appetite per nursing. OBJECTIVE:    /65 (BP 1 Location: Left arm, BP Patient Position: At rest)   Pulse 72   Temp 96.7 °F (35.9 °C)   Resp 18   Ht 5' 7\" (1.702 m)   Wt 46.8 kg (103 lb 3.2 oz)   SpO2 95%   BMI 16.16 kg/m²     General appearance - awake, NAD  Chest - good air entry noted in bases, no wheezes  Heart - S1 and S2 normal  Abdomen - soft, nontender, nondistended, Bowel sounds present, PEG site dressing +  Neurological - awake, moves all extremities well  Extremities - no pedal edema noted    ASSESSMENT:    1. Dislodged PEG Tube  2. Severe protein Malnutrition  3. Hx of Psychotic disorder (Schizoaffective)  4. Dementia  5. GERD  6. Hypokalemia  7. Failure to thrive     PLAN:    Cont current management - no plans of replacing PEG tube   Repleting K  Palliative care input noted  D/W DAUGHTER OVER THE PHONE @ [phone]: Wants nursing home with hospice care.     to follow  Hospice input appreciated - per CM , still waiting on acceptance , will follow     Disposition plan:  LTC with hospice care     CMP:   No results found for: NA, K, CL, CO2, AGAP, GLU, BUN, CREA, GFRAA, GFRNA, CA, MG, PHOS, ALB, TBIL, TP, ALB, GLOB, AGRAT, SGOT, ALT, GPT  CBC:   Lab Results   Component Value Date/Time    WBC 6.4 06/03/2019 02:32 AM    HGB 12.0 (L) 06/03/2019 02:32 AM    HCT 36.2 06/03/2019 02:32 AM     06/03/2019 02:32 AM

## 2019-06-03 NOTE — ROUTINE PROCESS
Bedside and Verbal shift change report given to LUIGI Mtz (oncoming nurse) by Jordan Infante (offgoing nurse). Report included the following information SBAR, Kardex, Intake/Output, MAR and Recent Results.

## 2019-06-03 NOTE — PROGRESS NOTES
NUTRITION    Nursing Referral: MST, EN/PN PTA      RECOMMENDATIONS / PLAN:     - Continue oral diet and supplements as desired/appropriate with goals of care. - Continue RD inpatient monitoring and evaluation. NUTRITION INTERVENTIONS & DIAGNOSIS:     [x] Meals/snacks: modified composition  [x] Medical food supplement therapy: continue as desired    Nutrition Diagnosis:    Inadequate energy intake related to mentation and PEG tube dislodgement as evidenced by poor oral intake, now without EN access. Underweight related to predicted prolonged inadequate oral intake as evidenced by BMI 16.13 kg/m^2    Patient meets criteria for Severe Protein Calorie Malnutrition as evidenced by:   ASPEN Malnutrition Criteria  Acute Illness, Chronic Illness, or Social/Enviornmental: Chronic illness  Body Fat: Severe(buccal, triceps)  Muscle Mass: Severe(calves, temples)  ASPEN Malnutrition Score - Chronic Illness: 12  Chronic Illness - Malnutrition Diagnosis: Severe malnutrition. ASSESSMENT:     6/3: Pt with plan for hospice. No plan for replacing feeding tube. PO intake is variable, better this morning. Consuming 100% of supplements. 5/31: Surgery signed off no peritoneal signs, started on diet per SLP today. Bites/sips with encouragement and assistance from family today. Palliative following and awaiting goals of care discussion regarding PEG reinsertion. Hospice consulted. Constipation noted on CT.  5/30: Pt alert, dementia; not answering questions. Did denied having any discomfort at this time. Pt admitted for dislodged PEG tube; noted removed by patient. GI unable to replace PEG. Noted consult to IR for possible replacement. NFPE conducted. Noted pt had PEG placed on 5/29/19 due to poor po intake; does not have dysphagia per H&P.  Admitted from rehab facility    Average po intake/ EN infusion adequate to meet patients estimated nutritional needs:   [] Yes     [x] No   [] Unable to determine at this time    Diet: DIET DYSPHAGIA PUREED (NDD1)  DIET NUTRITIONAL SUPPLEMENTS Breakfast, Lunch; ENSURE ENLIVE  DIET NUTRITIONAL SUPPLEMENTS Dinner; MAGIC CUPS   Food Allergies:  NKFA  Current Appetite:   [] Good     [x] Fair     [x] Poor     [] Other:   Appetite/meal intake prior to admission:   [] Good     [] Fair     [] Poor     [x] Other: unknown  Feeding Limitations:  [] Swallowing difficulty    [] Chewing difficulty    [x] Other: dementia  Current Meal Intake: Patient Vitals for the past 100 hrs:   % Diet Eaten   06/03/19 0812 50 %   06/02/19 1653 25 %   06/02/19 1256 10 %   06/02/19 1005 25 %   06/02/19 0858 5 %   06/01/19 1200 5 %   06/01/19 0940 5 %   05/31/19 1850 25 %   05/31/19 1332 50 %   05/31/19 0851 0 %       BM: 6/2  Skin Integrity: stage 1 pressure injury to sacrum & right heel, abdominal incision (PEG site)  Edema:   [x] No     [] Yes   Pertinent Medications: Reviewed: colace, pepcid, remeron, zofran    Recent Labs     06/02/19  0422 06/01/19  0520   K 3.3* 3.4*       Intake/Output Summary (Last 24 hours) at 6/3/2019 1126  Last data filed at 6/3/2019 0812  Gross per 24 hour   Intake 1040 ml   Output    Net 1040 ml       Anthropometrics:  Ht Readings from Last 1 Encounters:   06/01/19 5' 7\" (1.702 m)     Last 3 Recorded Weights in this Encounter    05/30/19 0350 06/01/19 0435   Weight: 46.7 kg (103 lb) 46.8 kg (103 lb 3.2 oz)     Body mass index is 16.16 kg/m².    Underweight     Weight History:  -33 lb (24%) x 1.5 year PTA per chart hx    Weight Metrics 6/1/2019 5/29/2019 10/26/2017   Weight 103 lb 3.2 oz 103 lb 1 oz 136 lb   BMI 16.16 kg/m2 16.14 kg/m2 20.68 kg/m2        Admitting Diagnosis: PEG tube malfunction (HCC) [K94.23]  PEG tube malfunction (HCC) [K94.23]  Pertinent PMHx:  Alcohol abuse, dementia, dysphagia (although also noted that pt currently does not have this), GERD, esophageal obstruction, depression, PUD, vitamin D deficiency     Education Needs:        [x] None identified  [] Identified - Not appropriate at this time  []  Identified and addressed - refer to education log  Learning Limitations:   [] None identified  [x] Identified: dementia     Cultural, Mosque & ethnic food preferences:  [x] None identified    [] Identified and addressed     ESTIMATED NUTRITION NEEDS:      Calories: 7587-2144 kcal (MSJx1.2-1.3) based on  [x] Actual BW: 47 kg      [] IBW   Protein: 38-56 gm (0.8-1.2 gm/kg) based on  [x] Actual BW      [] IBW   Fluid: 1 mL/kcal     MONITORING & EVALUATION:     Nutrition Goal(s):  Goals modified  1. Provide nutrition intervention as appropriate with goals of care for the next 5-7 days.  Outcome:  [x] Met/Ongoing    [] Progressing towards goal    [] Not progressing towards goal    [x] New/Initial goal      Monitoring:   [x] Food and beverage intake   [x] Diet order   [x] Nutrition-focused physical findings   [x] Treatment/therapy   [] Weight   [] Enteral nutrition intake        Previous Recommendations (for follow-up assessments only):     [x]   Implemented       []   Not Implemented (RD to address)      [] No Longer Appropriate     [x] No Recommendation Made     Discharge Planning: oral diet as tolerated with nutritional supplements as desired  [x] Participated in care planning, discharge planning, & interdisciplinary rounds as appropriate      Marge Li RD, [address]   Pager: [phone]

## 2019-06-03 NOTE — HOSPICE
Bedside visit. Juilana daughter at bedside. Juliana is agreeable to hospice services. Spoke with Volodymyr Lepe, waiting on facility approval for Medicaid bed. [facility]. Any questions or concerns please contact [phone].

## 2019-06-03 NOTE — PROGRESS NOTES
Palliative Medicine    Goals of care defined. Will sign off. Please reconsult team as needed/if appropriate. Thank you for the Palliative Medicine consult and allowing us to participate in the care of Mr. Leal.       Adolfo Juarez RN, BSN  Palliative Medicine Inpatient RN  [facility]  Palliative COPE Line: [phone] ([phone])

## 2019-06-03 NOTE — PROGRESS NOTES
Problem: Dysphagia (Adult)  Goal: *Acute Goals and Plan of Care (Insert Text)  Description  Patient will:  1. Tolerate PO trials with 0 s/s overt distress in 4/5 trials  2. Utilize compensatory swallow strategies/maneuvers (decrease bite/sip, size/rate, alt. liq/sol) with min cues in 4/5 trials  3. Perform oral-motor/laryngeal exercises to increase oropharyngeal swallow function with min cues  4. Complete an objective swallow study (i.e., MBSS) to assess swallow integrity, r/o aspiration, and determine of safest LRD, min A    Rec:     Puree with thin liquids  Assistance with all PO  Okay for soft solid if transitioned to comfort feeds  Aspiration precautions  HOB >45 during po intake, remain >30 for 30-45 minutes after po   Small bites/sips; alternate liquid/solid with slow feeding rate   Oral care TID  Meds crushed         Outcome: Progressing Towards Goal    SPEECH LANGUAGE PATHOLOGY DYSPHAGIA TREATMENT    Patient: Johan Hensley (71 y.o. male)  Date: 6/3/2019  Diagnosis: PEG tube malfunction (HCC) [K94.23]  PEG tube malfunction (HCC) [K94.23] <principal problem not specified>       Precautions: aspiration    PLOF: As per H&P      ASSESSMENT:  Pt was seen at bedside with daughter present for follow up dysphagia management. Pt resting comfortably upon SLP arrival. Per daughter: okay to leave pt rest at this time. Daughter reported consumption of almost all of breakfast PO with no coughing events. She reported improved PO intake since admission to hospital. She would not like pt to be upgraded to [facility] soft solids as he is performing so well with puree consistency and thin liquids. Per RN: no difficulty with med pass. SLP educated pt's daughter with regard to compensatory swallow strategies and aspiration/reflux precautions including: small bites/sips, alternate liquids/solids, decrease feeding rate, HOB > 45 with all po, and upright in bed at 30 degrees after po for at least 45 minutes.  Continue to rec  puree diet with thin liquids, meds crushed, oral care TID, and aspiration precautions. Full report to follow. Progression toward goals:  ?         Improving appropriately and progressing toward goals  ? Improving slowly and progressing toward goals  ? Not making progress toward goals and plan of care will be adjusted     PLAN:  Recommendations and Planned Interventions: See above  Patient continues to benefit from skilled intervention to address the above impairments. Continue treatment per established plan of care. Discharge Recommendations:  Emigdio Abernathy and To Be Determined     SUBJECTIVE:   Patient's daughter stated ? thank you so much for your help? .    OBJECTIVE:   Cognitive and Communication Status:  Neurologic State: Alert  Orientation Level: Unable to verbalize  Cognition: Follows commands     Dysphagia Treatment: see above    PAIN:  Start of Tx: 0  End of Tx: 0     After treatment:   ?              Patient left in no apparent distress sitting up in chair  ? Patient left in no apparent distress in bed  ? Call bell left within reach  ? Nursing notified  ? Family present  ? Caregiver present  ? Bed alarm activated      COMMUNICATION/EDUCATION:   ? Aspiration precautions; swallow safety; compensatory techniques  ?         Patient/family able to participate in training and education     Thank you for this referral.    Enoch Bustamante M.S. CCC-SLP/L  Speech-Language Pathologist

## 2019-06-03 NOTE — PROGRESS NOTES
Discharge planning    Spoke with Emily Castelan with [address] with [facility] and no acceptance at time.  Waiting for response from [address] [place] [address] 110, BSN, RN  Pager # [phone]  Care Manager

## 2019-06-03 NOTE — PROGRESS NOTES
Bedside and Verbal shift change report given to Yuridia Childress RN (oncoming nurse) by Joselyn Adair RN (offgoing nurse). Report included the following information SBAR, Kardex, Intake/Output and MAR.

## 2019-06-04 ENCOUNTER — HOME CARE VISIT (OUTPATIENT)
Dept: HOSPICE | Facility: HOSPICE | Age: 64
End: 2019-06-04
Payer: MEDICARE

## 2019-06-04 VITALS
BODY MASS INDEX: 16.2 KG/M2 | WEIGHT: 103.2 LBS | SYSTOLIC BLOOD PRESSURE: 119 MMHG | TEMPERATURE: 97.4 F | HEART RATE: 72 BPM | HEIGHT: 67 IN | OXYGEN SATURATION: 100 % | DIASTOLIC BLOOD PRESSURE: 74 MMHG | RESPIRATION RATE: 16 BRPM

## 2019-06-04 PROCEDURE — 3336590001 HSPC ROOM AND BOARD

## 2019-06-04 PROCEDURE — 0651 HSPC ROUTINE HOME CARE

## 2019-06-04 PROCEDURE — 3330220001 HC HSPC R&B RUG RATE

## 2019-06-04 PROCEDURE — 3336500001 HSPC ELECTION

## 2019-06-04 PROCEDURE — 74011250637 HC RX REV CODE- 250/637: Performed by: INTERNAL MEDICINE

## 2019-06-04 PROCEDURE — G0299 HHS/HOSPICE OF RN EA 15 MIN: HCPCS

## 2019-06-04 PROCEDURE — PD101 HC HSPC R&B RUG RATE

## 2019-06-04 PROCEDURE — 74011250636 HC RX REV CODE- 250/636: Performed by: HOSPITALIST

## 2019-06-04 PROCEDURE — 74011250637 HC RX REV CODE- 250/637: Performed by: HOSPITALIST

## 2019-06-04 RX ADMIN — MEMANTINE 5 MG: 10 TABLET ORAL at 08:30

## 2019-06-04 RX ADMIN — HEPARIN SODIUM 5000 UNITS: 5000 INJECTION INTRAVENOUS; SUBCUTANEOUS at 08:31

## 2019-06-04 RX ADMIN — MEMANTINE 5 MG: 10 TABLET ORAL at 18:04

## 2019-06-04 RX ADMIN — FAMOTIDINE 20 MG: 40 POWDER, FOR SUSPENSION ORAL at 08:30

## 2019-06-04 RX ADMIN — FAMOTIDINE 20 MG: 40 POWDER, FOR SUSPENSION ORAL at 18:04

## 2019-06-04 RX ADMIN — SERTRALINE HYDROCHLORIDE 100 MG: 50 TABLET ORAL at 08:30

## 2019-06-04 NOTE — ROUTINE PROCESS
Patient lying in bed with his eyes open, non verbal, respiration even and unlabored. No s/s of pain noted. Incontinent of urine andres care was given.  bolus in progress. No acute distress noted.

## 2019-06-04 NOTE — DISCHARGE INSTRUCTIONS
DISCHARGE SUMMARY from Nurse    PATIENT INSTRUCTIONS:    After general anesthesia or intravenous sedation, for 24 hours or while taking prescription Narcotics:  · Limit your activities  · Do not drive and operate hazardous machinery  · Do not make important personal or business decisions  · Do  not drink alcoholic beverages  · If you have not urinated within 8 hours after discharge, please contact your surgeon on call. Report the following to your surgeon:  · Excessive pain, swelling, redness or odor of or around the surgical area  · Temperature over 100.5  · Nausea and vomiting lasting longer than 4 hours or if unable to take medications  · Any signs of decreased circulation or nerve impairment to extremity: change in color, persistent  numbness, tingling, coldness or increase pain  · Any questions    What to do at Home:  Recommended activity: Activity as tolerated. *  Please give a list of your current medications to your Primary Care Provider. *  Please update this list whenever your medications are discontinued, doses are      changed, or new medications (including over-the-counter products) are added. *  Please carry medication information at all times in case of emergency situations. These are general instructions for a healthy lifestyle:    No smoking/ No tobacco products/ Avoid exposure to second hand smoke  Surgeon General's Warning:  Quitting smoking now greatly reduces serious risk to your health. Obesity, smoking, and sedentary lifestyle greatly increases your risk for illness    A healthy diet, regular physical exercise & weight monitoring are important for maintaining a healthy lifestyle    You may be retaining fluid if you have a history of heart failure or if you experience any of the following symptoms:  Weight gain of 3 pounds or more overnight or 5 pounds in a week, increased swelling in our hands or feet or shortness of breath while lying flat in bed.   Please call your doctor as soon as you notice any of these symptoms; do not wait until your next office visit. Recognize signs and symptoms of STROKE:    F-face looks uneven    A-arms unable to move or move unevenly    S-speech slurred or non-existent    T-time-call 911 as soon as signs and symptoms begin-DO NOT go       Back to bed or wait to see if you get better-TIME IS BRAIN. Warning Signs of HEART ATTACK     Call 911 if you have these symptoms:   Chest discomfort. Most heart attacks involve discomfort in the center of the chest that lasts more than a few minutes, or that goes away and comes back. It can feel like uncomfortable pressure, squeezing, fullness, or pain.  Discomfort in other areas of the upper body. Symptoms can include pain or discomfort in one or both arms, the back, neck, jaw, or stomach.  Shortness of breath with or without chest discomfort.  Other signs may include breaking out in a cold sweat, nausea, or lightheadedness. Don't wait more than five minutes to call 911 - MINUTES MATTER! Fast action can save your life. Calling 911 is almost always the fastest way to get lifesaving treatment. Emergency Medical Services staff can begin treatment when they arrive -- up to an hour sooner than if someone gets to the hospital by car. Patient armband removed and shredded. The discharge information has been reviewed with the patient. The patient verbalized understanding. Discharge medications reviewed with the patient and appropriate educational materials and side effects teaching were provided. _MyChart Activation    Thank you for requesting access to [facility]. Please follow the instructions below to securely access and download your online medical record. [facility] allows you to send messages to your doctor, view your test results, renew your prescriptions, schedule appointments, and more. How Do I Sign Up? 1. In your internet browser, go to [email or web address].   2. Click on the First Time User? Click Here link in the Sign In box. You will see the New Member Sign Up page. 3. Enter your [facility] Access Code exactly as it appears below. You will not need to use this code after youve completed the sign-up process. If you do not sign up before the expiration date, you must request a new code. [facility] Access Code: [email or web address]  Expires: 2019  3:47 PM (This is the date your [facility] access code will )    4. Enter the last four digits of your Social Security Number (xxxx) and Date of Birth (mm/dd/yyyy) as indicated and click Submit. You will be taken to the next sign-up page. 5. Create a [facility] ID. This will be your [facility] login ID and cannot be changed, so think of one that is secure and easy to remember. 6. Create a [facility] password. You can change your password at any time. 7. Enter your Password Reset Question and Answer. This can be used at a later time if you forget your password. 8. Enter your e-mail address. You will receive e-mail notification when new information is available in [address] 9. Click Sign Up. You can now view and download portions of your medical record. 10. Click the Download Summary menu link to download a portable copy of your medical information. Additional Information    If you have questions, please visit the Frequently Asked Questions section of the [employer] website at [email or web address] Remember, [employer] is NOT to be used for urgent needs.  For medical emergencies, dial 911.    __________________________________________________________________________________________________________________________________

## 2019-06-04 NOTE — PROGRESS NOTES
Discharge planning    Discharge order noted for today. Patient has been accepted to [address] [place] [address]. Confirmed with [facility] that bed is available today. Met with pt's daughter, Jessica Neff. agreeable to the transition plan today. Transport to facility has been arranged through medicaid stretcher transport at 6:30 pm.. Patient's discharge summary has been forwarded to skilled nursing facility via cc/Link. Bedside RN,Aissatou, has been updated to the transition plan. Discharge information has been updated on the AVS.  Please call report to [phone]     VALE Boswell, RN  Pager # [phone]  Care Manager

## 2019-06-04 NOTE — DISCHARGE SUMMARY
Discharge Summary    Patient: Gerhardt Hard MRN: [id number]  CSN: [id number]    YOB: 1955  Age: 58 y.o. Sex: male    DOA: 5/30/2019 LOS:  LOS: 4 days   Discharge Date:      Admission Diagnoses: PEG tube malfunction ([facility] [address] [K94.23]  PEG tube malfunction ([address] [K94.23]    Discharge Diagnoses:    PEG tube malfunction - does not need to replaced per GI / Gen surg  Dementia   Severe Malnutrition   Hospice Care       Discharge Condition: Stable    Consults: Gastroenterology, General Surgery and IR    PHYSICAL EXAM  Visit Vitals  /66 (BP 1 Location: Left arm, BP Patient Position: At rest)   Pulse 60   Temp 96 °F (35.6 °C)   Resp 14   Ht 5' 7\" (1.702 m)   Wt 46.8 kg (103 lb 3.2 oz)   SpO2 96%   BMI 16.16 kg/m²       General: Alert, cooperative, no acute distress    HEENT: PERRLA, EOMI. Anicteric sclerae. Lungs:  CTA Bilaterally. No Wheezing/Rhonchi/Rales. Heart:  Regular rate and Rhythm. Abdomen: Soft, Non distended, Non tender. + Bowel sounds. Extremities: No edema/ cyanosis/ clubbing  Psych:   Good insight. Not anxious or agitated. Neurologic:  AA oriented X 3. Moves all extremities. HPI:  Gerhardt Hard is a 58 y.o. male who has a hx of psychotic disorder and dementia, also has GERD and malnutrition. Patient had a PEG tube inserted yesterday at [facility] general reportedly because he was not taking enough po. He does not have dysphagia. Patient can only answer a few questions and incoherently, therefore no reliable history could be obtained. Hx supplemented from chart review and ED documentation. Patient is currently at a rehab facility. He was brought to hospital because he had pulled out his G-tube. I'm unable to ascertain whether or not he has pain. His surgeon at [facility] has no plans to reinsert the tube.      Serial abdominal exams and monitoring for possible peritonitis advised.        Hospital Course:   Pt was admitted to the hosp with dislodged PEG tube - per chart review it appears that pt had PEG tube placed at [facility] but no plans to reinsert per Gen surg   Also likely reason for PEG tube placement was poor oral intake   Pt seen by GI & Gen Surg - advised speech & swallow eval which he did well - recommendation was to continue thin liquid , puree diet with soft food   He has been doing well though PO intake is poor given his dementia   Family also agreed for hospice care - pt seen by Hospice & has been accepted at [facility] rehab   Pt is being discharged today     Imaging:  CT Abd & pelvis   IMPRESSION:     Free air but no free fluid in the abdomen.      Small changes at the anterior aspect of the greater curve of the stomach may be  site of previous gastrostomy tube placement.      Small renal cysts.      Constipation.      Multiple healing fractures. Discharge Medications:     Current Discharge Medication List      CONTINUE these medications which have NOT CHANGED    Details   DIMETHICONE EX Apply  to affected area as needed.      haloperidol (HALDOL) 2 mg/mL oral concentrate Take 1 mg by mouth two (2) times daily as needed. ketoconazole (NIZORAL) 2 % topical cream Apply  to affected area two (2) times a day. mirtazapine (REMERON) 30 mg tablet Take 30 mg by mouth nightly. sertraline (ZOLOFT) 100 mg tablet Take  by mouth daily. multivitamin (ONE A DAY) tablet Take 1 Tab by mouth daily. clonazePAM (KLONOPIN) 1 mg tablet Take  by mouth nightly. memantine (NAMENDA) 10 mg tablet Take  by mouth two (2) times a day. acetaminophen (TYLENOL) 325 mg tablet Take 325 mg by mouth every six (6) hours as needed. cholecalciferol (VITAMIN D3) 1,000 unit tablet Take 1,000 Units by mouth daily. melatonin 3 mg tablet Take 6 mg by mouth nightly. cetaphil (CETAPHIL) topical cream Apply  to affected area daily as needed.              Current Facility-Administered Medications:     famotidine (PEPCID) 40 mg/5 mL (8 mg/mL) oral suspension 20 mg, 20 mg, Oral, BID, Lucio Hoff MD, 20 mg at 06/04/19 0830    docusate sodium (COLACE) capsule 100 mg, 100 mg, Oral, DAILY, Lucio Hoff MD, Stopped at 06/04/19 0900    clonazePAM (KlonoPIN) tablet 1 mg, 1 mg, Oral, QHS, Ajith Hahn MD, 1 mg at 06/03/19 2126    melatonin tablet 6 mg, 6 mg, Oral, QHS, Ajith Hahn MD, 6 mg at 06/03/19 2126    memantine (NAMENDA) tablet 5 mg, 5 mg, Oral, BID, Michi HERBERT MD, 5 mg at 06/04/19 0830    mirtazapine (REMERON) tablet 30 mg, 30 mg, Oral, QHS, Michi HERBERT MD, 30 mg at 06/03/19 2125    sertraline (ZOLOFT) tablet 100 mg, 100 mg, Oral, DAILY, Michi HERBERT MD, 100 mg at 06/04/19 0830    acetaminophen (TYLENOL) tablet 650 mg, 650 mg, Oral, Q4H PRN, Aicha Ramirez MD, 650 mg at 05/31/19 1250    heparin (porcine) injection 5,000 Units, 5,000 Units, SubCUTAneous, Q12H, Ajith Hahn MD, 5,000 Units at 06/04/19 0831    albuterol-ipratropium (DUO-NEB) 2.5 MG-0.5 MG/3 ML, 3 mL, Nebulization, Q4H PRN, Lucio Hoff MD    ondansetron [facility]) injection 4 mg, 4 mg, IntraVENous, Q4H PRN, Lucio Hoff MD  · It is important that you take the medication exactly as they are prescribed. · Keep your medication in the bottles provided by the pharmacist and keep a list of the medication names, dosages, and times to be taken in your wallet. · Do not take other medications without consulting your doctor.          Minutes spent on discharge: 42 minutes spent coordinating this discharge (review instructions/follow-up, prescriptions, preparing report for sign off)    Elvia Essex, MD  6/4/2019 12:37 PM

## 2019-06-04 NOTE — ROUTINE PROCESS
Bedside and Verbal shift change report given to Farhad Hernandez (oncoming nurse) by Marly Mercado RN (offgoing nurse). Report included the following information SBAR, Kardex, Procedure Summary, Intake/Output, MAR and Recent Results.

## 2019-06-04 NOTE — PROGRESS NOTES
Per Ladonna Councilman (TIERA) called Medicaid transportation at [phone] to schedule stretcher transport to [facility] with Casey Macario and received confirmation number [id number]. Called [facility] scheduled transport for 6:30 pm to [facility] with Clayton Madera. Gave Pat confirmation number [id number]. Informed Ladonna Councilman of transportation arrangements.

## 2019-06-04 NOTE — HOSPICE
Spoke with Alessandro Traore via phone, patient waiting on Medicaid bed placement. Thank you for the referral to [facility]. Any questions or concerns please contact [phone].

## 2019-06-04 NOTE — ROUTINE PROCESS
Patient for d/c to [facility] rehab. Report called to BODØ at the [facility] unit.

## 2019-06-05 ENCOUNTER — HOME CARE VISIT (OUTPATIENT)
Dept: HOSPICE | Facility: HOSPICE | Age: 64
End: 2019-06-05
Payer: MEDICARE

## 2019-06-05 VITALS
DIASTOLIC BLOOD PRESSURE: 60 MMHG | OXYGEN SATURATION: 95 % | WEIGHT: 103 LBS | SYSTOLIC BLOOD PRESSURE: 104 MMHG | HEIGHT: 67 IN | BODY MASS INDEX: 16.17 KG/M2 | RESPIRATION RATE: 12 BRPM | HEART RATE: 70 BPM | TEMPERATURE: 96.9 F

## 2019-06-05 PROCEDURE — T4521 ADULT SIZE BRIEF/DIAPER SM: HCPCS

## 2019-06-05 PROCEDURE — A6250 SKIN SEAL PROTECT MOISTURIZR: HCPCS

## 2019-06-05 PROCEDURE — A9270 NON-COVERED ITEM OR SERVICE: HCPCS

## 2019-06-05 PROCEDURE — HHS10554 SHAMPOO/BODY WASH 8 OZ ALOE VESTA

## 2019-06-05 PROCEDURE — 0651 HSPC ROUTINE HOME CARE

## 2019-06-05 PROCEDURE — 3336590001 HSPC ROOM AND BOARD

## 2019-06-05 PROCEDURE — T4541 LARGE DISPOSABLE UNDERPAD: HCPCS

## 2019-06-05 PROCEDURE — A4927 NON-STERILE GLOVES: HCPCS

## 2019-06-05 PROCEDURE — G0299 HHS/HOSPICE OF RN EA 15 MIN: HCPCS

## 2019-06-06 ENCOUNTER — HOME CARE VISIT (OUTPATIENT)
Dept: HOSPICE | Facility: HOSPICE | Age: 64
End: 2019-06-06
Payer: MEDICARE

## 2019-06-06 ENCOUNTER — HOME CARE VISIT (OUTPATIENT)
Dept: SCHEDULING | Facility: HOME HEALTH | Age: 64
End: 2019-06-06
Payer: MEDICARE

## 2019-06-06 PROCEDURE — 0651 HSPC ROUTINE HOME CARE

## 2019-06-06 PROCEDURE — G0155 HHCP-SVS OF CSW,EA 15 MIN: HCPCS

## 2019-06-06 PROCEDURE — 3336590001 HSPC ROOM AND BOARD

## 2019-06-06 PROCEDURE — G0299 HHS/HOSPICE OF RN EA 15 MIN: HCPCS

## 2019-06-07 ENCOUNTER — HOME CARE VISIT (OUTPATIENT)
Dept: HOSPICE | Facility: HOSPICE | Age: 64
End: 2019-06-07
Payer: MEDICARE

## 2019-06-07 PROCEDURE — 0651 HSPC ROUTINE HOME CARE

## 2019-06-07 PROCEDURE — G0299 HHS/HOSPICE OF RN EA 15 MIN: HCPCS

## 2019-06-07 PROCEDURE — 3336590001 HSPC ROOM AND BOARD

## 2019-06-08 PROCEDURE — 3336590001 HSPC ROOM AND BOARD

## 2019-06-08 PROCEDURE — 0651 HSPC ROUTINE HOME CARE

## 2019-06-09 PROCEDURE — 3336590001 HSPC ROOM AND BOARD

## 2019-06-09 PROCEDURE — 0651 HSPC ROUTINE HOME CARE

## 2019-06-10 PROCEDURE — 0651 HSPC ROUTINE HOME CARE

## 2019-06-10 PROCEDURE — 3336590001 HSPC ROOM AND BOARD

## 2019-06-11 ENCOUNTER — HOME CARE VISIT (OUTPATIENT)
Dept: SCHEDULING | Facility: HOME HEALTH | Age: 64
End: 2019-06-11
Payer: MEDICARE

## 2019-06-11 ENCOUNTER — HOME CARE VISIT (OUTPATIENT)
Dept: HOSPICE | Facility: HOSPICE | Age: 64
End: 2019-06-11
Payer: MEDICARE

## 2019-06-11 VITALS
OXYGEN SATURATION: 92 % | DIASTOLIC BLOOD PRESSURE: 64 MMHG | TEMPERATURE: 97.5 F | SYSTOLIC BLOOD PRESSURE: 110 MMHG | HEART RATE: 71 BPM | RESPIRATION RATE: 18 BRPM

## 2019-06-11 VITALS
TEMPERATURE: 97.7 F | DIASTOLIC BLOOD PRESSURE: 60 MMHG | RESPIRATION RATE: 16 BRPM | HEART RATE: 60 BPM | OXYGEN SATURATION: 96 % | SYSTOLIC BLOOD PRESSURE: 90 MMHG

## 2019-06-11 PROCEDURE — HOSPICE MEDICATION HC HH HOSPICE MEDICATION

## 2019-06-11 PROCEDURE — G0156 HHCP-SVS OF AIDE,EA 15 MIN: HCPCS

## 2019-06-11 PROCEDURE — G0299 HHS/HOSPICE OF RN EA 15 MIN: HCPCS

## 2019-06-11 PROCEDURE — 3336590001 HSPC ROOM AND BOARD

## 2019-06-11 PROCEDURE — 0651 HSPC ROUTINE HOME CARE

## 2019-06-12 ENCOUNTER — HOME CARE VISIT (OUTPATIENT)
Dept: HOSPICE | Facility: HOSPICE | Age: 64
End: 2019-06-12
Payer: MEDICARE

## 2019-06-12 PROCEDURE — 0651 HSPC ROUTINE HOME CARE

## 2019-06-12 PROCEDURE — 3336590001 HSPC ROOM AND BOARD

## 2019-06-13 ENCOUNTER — HOME CARE VISIT (OUTPATIENT)
Dept: HOSPICE | Facility: HOSPICE | Age: 64
End: 2019-06-13
Payer: MEDICARE

## 2019-06-13 ENCOUNTER — HOME CARE VISIT (OUTPATIENT)
Dept: SCHEDULING | Facility: HOME HEALTH | Age: 64
End: 2019-06-13
Payer: MEDICARE

## 2019-06-13 PROCEDURE — G0299 HHS/HOSPICE OF RN EA 15 MIN: HCPCS

## 2019-06-13 PROCEDURE — 3336590001 HSPC ROOM AND BOARD

## 2019-06-13 PROCEDURE — 0651 HSPC ROUTINE HOME CARE

## 2019-06-14 ENCOUNTER — HOME CARE VISIT (OUTPATIENT)
Dept: SCHEDULING | Facility: HOME HEALTH | Age: 64
End: 2019-06-14
Payer: MEDICARE

## 2019-06-14 VITALS
SYSTOLIC BLOOD PRESSURE: 100 MMHG | DIASTOLIC BLOOD PRESSURE: 52 MMHG | TEMPERATURE: 98 F | OXYGEN SATURATION: 89 % | RESPIRATION RATE: 18 BRPM | HEART RATE: 80 BPM

## 2019-06-14 PROCEDURE — G0156 HHCP-SVS OF AIDE,EA 15 MIN: HCPCS

## 2019-06-14 PROCEDURE — 0651 HSPC ROUTINE HOME CARE

## 2019-06-14 PROCEDURE — 3336590001 HSPC ROOM AND BOARD

## 2019-06-15 PROCEDURE — 0651 HSPC ROUTINE HOME CARE

## 2019-06-15 PROCEDURE — 3336590001 HSPC ROOM AND BOARD

## 2019-06-16 PROCEDURE — 0651 HSPC ROUTINE HOME CARE

## 2019-06-16 PROCEDURE — 3336590001 HSPC ROOM AND BOARD

## 2019-06-17 PROCEDURE — 3336590001 HSPC ROOM AND BOARD

## 2019-06-17 PROCEDURE — 0651 HSPC ROUTINE HOME CARE

## 2019-06-18 ENCOUNTER — HOME CARE VISIT (OUTPATIENT)
Dept: SCHEDULING | Facility: HOME HEALTH | Age: 64
End: 2019-06-18
Payer: MEDICARE

## 2019-06-18 PROCEDURE — G0299 HHS/HOSPICE OF RN EA 15 MIN: HCPCS

## 2019-06-18 PROCEDURE — 3336590001 HSPC ROOM AND BOARD

## 2019-06-18 PROCEDURE — 0651 HSPC ROUTINE HOME CARE

## 2019-06-19 PROCEDURE — 0651 HSPC ROUTINE HOME CARE

## 2019-06-19 PROCEDURE — 3336590001 HSPC ROOM AND BOARD

## 2019-06-20 ENCOUNTER — HOME CARE VISIT (OUTPATIENT)
Dept: SCHEDULING | Facility: HOME HEALTH | Age: 64
End: 2019-06-20
Payer: MEDICARE

## 2019-06-20 PROCEDURE — 0651 HSPC ROUTINE HOME CARE

## 2019-06-20 PROCEDURE — 3336590001 HSPC ROOM AND BOARD

## 2019-06-20 PROCEDURE — T4522 ADULT SIZE BRIEF/DIAPER MED: HCPCS

## 2019-06-20 PROCEDURE — G0156 HHCP-SVS OF AIDE,EA 15 MIN: HCPCS

## 2019-06-20 PROCEDURE — A6250 SKIN SEAL PROTECT MOISTURIZR: HCPCS

## 2019-06-21 ENCOUNTER — HOME CARE VISIT (OUTPATIENT)
Dept: HOSPICE | Facility: HOSPICE | Age: 64
End: 2019-06-21
Payer: MEDICARE

## 2019-06-21 PROCEDURE — G0299 HHS/HOSPICE OF RN EA 15 MIN: HCPCS

## 2019-06-21 PROCEDURE — 0651 HSPC ROUTINE HOME CARE

## 2019-06-21 PROCEDURE — 3336590001 HSPC ROOM AND BOARD

## 2019-06-22 PROCEDURE — 0651 HSPC ROUTINE HOME CARE

## 2019-06-22 PROCEDURE — 3336590001 HSPC ROOM AND BOARD

## 2019-06-23 PROCEDURE — 0651 HSPC ROUTINE HOME CARE

## 2019-06-23 PROCEDURE — 3336590001 HSPC ROOM AND BOARD

## 2019-06-24 ENCOUNTER — HOME CARE VISIT (OUTPATIENT)
Dept: SCHEDULING | Facility: HOME HEALTH | Age: 64
End: 2019-06-24
Payer: MEDICARE

## 2019-06-24 PROCEDURE — G0156 HHCP-SVS OF AIDE,EA 15 MIN: HCPCS

## 2019-06-24 PROCEDURE — 3336590001 HSPC ROOM AND BOARD

## 2019-06-24 PROCEDURE — 0651 HSPC ROUTINE HOME CARE

## 2019-06-25 ENCOUNTER — HOME CARE VISIT (OUTPATIENT)
Dept: SCHEDULING | Facility: HOME HEALTH | Age: 64
End: 2019-06-25
Payer: MEDICARE

## 2019-06-25 ENCOUNTER — HOME CARE VISIT (OUTPATIENT)
Dept: HOSPICE | Facility: HOSPICE | Age: 64
End: 2019-06-25
Payer: MEDICARE

## 2019-06-25 PROCEDURE — 0651 HSPC ROUTINE HOME CARE

## 2019-06-25 PROCEDURE — 3336590001 HSPC ROOM AND BOARD

## 2019-06-25 PROCEDURE — G0299 HHS/HOSPICE OF RN EA 15 MIN: HCPCS

## 2019-06-26 PROCEDURE — 3336590001 HSPC ROOM AND BOARD

## 2019-06-26 PROCEDURE — 0651 HSPC ROUTINE HOME CARE

## 2019-06-27 ENCOUNTER — HOME CARE VISIT (OUTPATIENT)
Dept: SCHEDULING | Facility: HOME HEALTH | Age: 64
End: 2019-06-27
Payer: MEDICARE

## 2019-06-27 PROCEDURE — HOSPICE MEDICATION HC HH HOSPICE MEDICATION

## 2019-06-27 PROCEDURE — 3336590001 HSPC ROOM AND BOARD

## 2019-06-27 PROCEDURE — G0156 HHCP-SVS OF AIDE,EA 15 MIN: HCPCS

## 2019-06-27 PROCEDURE — 0651 HSPC ROUTINE HOME CARE

## 2019-06-28 ENCOUNTER — HOME CARE VISIT (OUTPATIENT)
Dept: SCHEDULING | Facility: HOME HEALTH | Age: 64
End: 2019-06-28
Payer: MEDICARE

## 2019-06-28 PROCEDURE — G0299 HHS/HOSPICE OF RN EA 15 MIN: HCPCS

## 2019-06-28 PROCEDURE — 0651 HSPC ROUTINE HOME CARE

## 2019-06-28 PROCEDURE — 3336590001 HSPC ROOM AND BOARD

## 2019-06-29 PROCEDURE — 0651 HSPC ROUTINE HOME CARE

## 2019-06-29 PROCEDURE — 3336590001 HSPC ROOM AND BOARD

## 2019-06-30 PROCEDURE — 3336590001 HSPC ROOM AND BOARD

## 2019-06-30 PROCEDURE — 0651 HSPC ROUTINE HOME CARE

## 2019-07-01 ENCOUNTER — HOME CARE VISIT (OUTPATIENT)
Dept: SCHEDULING | Facility: HOME HEALTH | Age: 64
End: 2019-07-01
Payer: MEDICARE

## 2019-07-01 PROCEDURE — G0156 HHCP-SVS OF AIDE,EA 15 MIN: HCPCS

## 2019-07-01 PROCEDURE — 0651 HSPC ROUTINE HOME CARE

## 2019-07-01 PROCEDURE — 3336590001 HSPC ROOM AND BOARD

## 2019-07-02 ENCOUNTER — HOME CARE VISIT (OUTPATIENT)
Dept: SCHEDULING | Facility: HOME HEALTH | Age: 64
End: 2019-07-02
Payer: MEDICARE

## 2019-07-02 ENCOUNTER — HOME CARE VISIT (OUTPATIENT)
Dept: HOSPICE | Facility: HOSPICE | Age: 64
End: 2019-07-02
Payer: MEDICARE

## 2019-07-02 PROCEDURE — G0299 HHS/HOSPICE OF RN EA 15 MIN: HCPCS

## 2019-07-02 PROCEDURE — A6250 SKIN SEAL PROTECT MOISTURIZR: HCPCS

## 2019-07-02 PROCEDURE — T4522 ADULT SIZE BRIEF/DIAPER MED: HCPCS

## 2019-07-02 PROCEDURE — 3336590001 HSPC ROOM AND BOARD

## 2019-07-02 PROCEDURE — G0155 HHCP-SVS OF CSW,EA 15 MIN: HCPCS

## 2019-07-02 PROCEDURE — 0651 HSPC ROUTINE HOME CARE

## 2019-07-03 VITALS
DIASTOLIC BLOOD PRESSURE: 54 MMHG | TEMPERATURE: 97.9 F | SYSTOLIC BLOOD PRESSURE: 95 MMHG | OXYGEN SATURATION: 97 % | HEART RATE: 68 BPM | RESPIRATION RATE: 16 BRPM

## 2019-07-03 PROCEDURE — 3336590001 HSPC ROOM AND BOARD

## 2019-07-03 PROCEDURE — 0651 HSPC ROUTINE HOME CARE

## 2019-07-04 ENCOUNTER — HOME CARE VISIT (OUTPATIENT)
Dept: SCHEDULING | Facility: HOME HEALTH | Age: 64
End: 2019-07-04
Payer: MEDICARE

## 2019-07-04 PROCEDURE — 0651 HSPC ROUTINE HOME CARE

## 2019-07-04 PROCEDURE — 3336590001 HSPC ROOM AND BOARD

## 2019-07-04 PROCEDURE — G0156 HHCP-SVS OF AIDE,EA 15 MIN: HCPCS

## 2019-07-04 PROCEDURE — HOSPICE MEDICATION HC HH HOSPICE MEDICATION

## 2019-07-05 ENCOUNTER — HOME CARE VISIT (OUTPATIENT)
Dept: SCHEDULING | Facility: HOME HEALTH | Age: 64
End: 2019-07-05
Payer: MEDICARE

## 2019-07-05 PROCEDURE — G0299 HHS/HOSPICE OF RN EA 15 MIN: HCPCS

## 2019-07-05 PROCEDURE — 0651 HSPC ROUTINE HOME CARE

## 2019-07-05 PROCEDURE — 3336590001 HSPC ROOM AND BOARD

## 2019-07-06 PROCEDURE — 0651 HSPC ROUTINE HOME CARE

## 2019-07-06 PROCEDURE — HOSPICE MEDICATION HC HH HOSPICE MEDICATION

## 2019-07-06 PROCEDURE — 3336590001 HSPC ROOM AND BOARD

## 2019-07-07 PROCEDURE — HOSPICE MEDICATION HC HH HOSPICE MEDICATION

## 2019-07-07 PROCEDURE — 3336590001 HSPC ROOM AND BOARD

## 2019-07-07 PROCEDURE — 0651 HSPC ROUTINE HOME CARE

## 2019-07-08 ENCOUNTER — HOME CARE VISIT (OUTPATIENT)
Dept: SCHEDULING | Facility: HOME HEALTH | Age: 64
End: 2019-07-08
Payer: MEDICARE

## 2019-07-08 PROCEDURE — G0156 HHCP-SVS OF AIDE,EA 15 MIN: HCPCS

## 2019-07-08 PROCEDURE — 0651 HSPC ROUTINE HOME CARE

## 2019-07-08 PROCEDURE — 3336590001 HSPC ROOM AND BOARD

## 2019-07-09 ENCOUNTER — HOME CARE VISIT (OUTPATIENT)
Dept: HOSPICE | Facility: HOSPICE | Age: 64
End: 2019-07-09
Payer: MEDICARE

## 2019-07-09 VITALS
HEART RATE: 107 BPM | RESPIRATION RATE: 14 BRPM | DIASTOLIC BLOOD PRESSURE: 77 MMHG | OXYGEN SATURATION: 97 % | TEMPERATURE: 97.6 F | SYSTOLIC BLOOD PRESSURE: 102 MMHG

## 2019-07-09 PROCEDURE — G0299 HHS/HOSPICE OF RN EA 15 MIN: HCPCS

## 2019-07-09 PROCEDURE — 0651 HSPC ROUTINE HOME CARE

## 2019-07-09 PROCEDURE — 3336590001 HSPC ROOM AND BOARD

## 2019-07-10 ENCOUNTER — HOME CARE VISIT (OUTPATIENT)
Dept: HOSPICE | Facility: HOSPICE | Age: 64
End: 2019-07-10
Payer: MEDICARE

## 2019-07-10 PROCEDURE — 0651 HSPC ROUTINE HOME CARE

## 2019-07-10 PROCEDURE — 3336590001 HSPC ROOM AND BOARD

## 2019-07-11 ENCOUNTER — HOME CARE VISIT (OUTPATIENT)
Dept: SCHEDULING | Facility: HOME HEALTH | Age: 64
End: 2019-07-11
Payer: MEDICARE

## 2019-07-11 PROCEDURE — G0156 HHCP-SVS OF AIDE,EA 15 MIN: HCPCS

## 2019-07-11 PROCEDURE — 3336590001 HSPC ROOM AND BOARD

## 2019-07-11 PROCEDURE — 0651 HSPC ROUTINE HOME CARE

## 2019-07-12 ENCOUNTER — HOME CARE VISIT (OUTPATIENT)
Dept: HOSPICE | Facility: HOSPICE | Age: 64
End: 2019-07-12
Payer: MEDICARE

## 2019-07-12 PROCEDURE — 3336590001 HSPC ROOM AND BOARD

## 2019-07-12 PROCEDURE — 0651 HSPC ROUTINE HOME CARE

## 2019-07-13 PROCEDURE — 3336590001 HSPC ROOM AND BOARD

## 2019-07-13 PROCEDURE — 0651 HSPC ROUTINE HOME CARE

## 2019-07-14 PROCEDURE — HOSPICE MEDICATION HC HH HOSPICE MEDICATION

## 2019-07-14 PROCEDURE — 0651 HSPC ROUTINE HOME CARE

## 2019-07-14 PROCEDURE — 3336590001 HSPC ROOM AND BOARD

## 2019-07-15 ENCOUNTER — HOME CARE VISIT (OUTPATIENT)
Dept: SCHEDULING | Facility: HOME HEALTH | Age: 64
End: 2019-07-15
Payer: MEDICARE

## 2019-07-15 PROCEDURE — G0156 HHCP-SVS OF AIDE,EA 15 MIN: HCPCS

## 2019-07-15 PROCEDURE — 3336590001 HSPC ROOM AND BOARD

## 2019-07-15 PROCEDURE — 0651 HSPC ROUTINE HOME CARE

## 2019-07-16 ENCOUNTER — HOME CARE VISIT (OUTPATIENT)
Dept: SCHEDULING | Facility: HOME HEALTH | Age: 64
End: 2019-07-16
Payer: MEDICARE

## 2019-07-16 VITALS
SYSTOLIC BLOOD PRESSURE: 104 MMHG | OXYGEN SATURATION: 97 % | HEART RATE: 86 BPM | RESPIRATION RATE: 16 BRPM | DIASTOLIC BLOOD PRESSURE: 60 MMHG | TEMPERATURE: 97.1 F

## 2019-07-16 PROCEDURE — 0651 HSPC ROUTINE HOME CARE

## 2019-07-16 PROCEDURE — G0299 HHS/HOSPICE OF RN EA 15 MIN: HCPCS

## 2019-07-16 PROCEDURE — 3336590001 HSPC ROOM AND BOARD

## 2019-07-17 PROCEDURE — T4522 ADULT SIZE BRIEF/DIAPER MED: HCPCS

## 2019-07-17 PROCEDURE — HOSPICE MEDICATION HC HH HOSPICE MEDICATION

## 2019-07-17 PROCEDURE — A6250 SKIN SEAL PROTECT MOISTURIZR: HCPCS

## 2019-07-17 PROCEDURE — 0651 HSPC ROUTINE HOME CARE

## 2019-07-17 PROCEDURE — 3336590001 HSPC ROOM AND BOARD

## 2019-07-18 PROCEDURE — 3336590001 HSPC ROOM AND BOARD

## 2019-07-18 PROCEDURE — 0651 HSPC ROUTINE HOME CARE

## 2019-07-18 PROCEDURE — HOSPICE MEDICATION HC HH HOSPICE MEDICATION

## 2019-07-19 ENCOUNTER — HOME CARE VISIT (OUTPATIENT)
Dept: SCHEDULING | Facility: HOME HEALTH | Age: 64
End: 2019-07-19
Payer: MEDICARE

## 2019-07-19 PROCEDURE — 3336590001 HSPC ROOM AND BOARD

## 2019-07-19 PROCEDURE — 0651 HSPC ROUTINE HOME CARE

## 2019-07-19 PROCEDURE — G0156 HHCP-SVS OF AIDE,EA 15 MIN: HCPCS

## 2019-07-20 PROCEDURE — 3336590001 HSPC ROOM AND BOARD

## 2019-07-20 PROCEDURE — 0651 HSPC ROUTINE HOME CARE

## 2019-07-21 PROCEDURE — 3336590001 HSPC ROOM AND BOARD

## 2019-07-21 PROCEDURE — 0651 HSPC ROUTINE HOME CARE

## 2019-07-22 ENCOUNTER — HOME CARE VISIT (OUTPATIENT)
Dept: SCHEDULING | Facility: HOME HEALTH | Age: 64
End: 2019-07-22
Payer: MEDICARE

## 2019-07-22 PROCEDURE — 0651 HSPC ROUTINE HOME CARE

## 2019-07-22 PROCEDURE — G0156 HHCP-SVS OF AIDE,EA 15 MIN: HCPCS

## 2019-07-22 PROCEDURE — 3336590001 HSPC ROOM AND BOARD

## 2019-07-23 ENCOUNTER — HOME CARE VISIT (OUTPATIENT)
Dept: SCHEDULING | Facility: HOME HEALTH | Age: 64
End: 2019-07-23
Payer: MEDICARE

## 2019-07-23 ENCOUNTER — HOME CARE VISIT (OUTPATIENT)
Dept: HOSPICE | Facility: HOSPICE | Age: 64
End: 2019-07-23
Payer: MEDICARE

## 2019-07-23 PROCEDURE — T4522 ADULT SIZE BRIEF/DIAPER MED: HCPCS

## 2019-07-23 PROCEDURE — G0299 HHS/HOSPICE OF RN EA 15 MIN: HCPCS

## 2019-07-23 PROCEDURE — A9270 NON-COVERED ITEM OR SERVICE: HCPCS

## 2019-07-23 PROCEDURE — A9150 MISC/EXPER NON-PRESCRIPT DRU: HCPCS

## 2019-07-23 PROCEDURE — 0651 HSPC ROUTINE HOME CARE

## 2019-07-23 PROCEDURE — 3336590001 HSPC ROOM AND BOARD

## 2019-07-24 PROCEDURE — 0651 HSPC ROUTINE HOME CARE

## 2019-07-24 PROCEDURE — 3336590001 HSPC ROOM AND BOARD

## 2019-07-24 PROCEDURE — HOSPICE MEDICATION HC HH HOSPICE MEDICATION

## 2019-07-25 ENCOUNTER — HOME CARE VISIT (OUTPATIENT)
Dept: SCHEDULING | Facility: HOME HEALTH | Age: 64
End: 2019-07-25
Payer: MEDICARE

## 2019-07-25 PROCEDURE — HOSPICE MEDICATION HC HH HOSPICE MEDICATION

## 2019-07-25 PROCEDURE — 0651 HSPC ROUTINE HOME CARE

## 2019-07-25 PROCEDURE — G0156 HHCP-SVS OF AIDE,EA 15 MIN: HCPCS

## 2019-07-25 PROCEDURE — 3336590001 HSPC ROOM AND BOARD

## 2019-07-26 PROCEDURE — 3336590001 HSPC ROOM AND BOARD

## 2019-07-26 PROCEDURE — 0651 HSPC ROUTINE HOME CARE

## 2019-07-26 PROCEDURE — HOSPICE MEDICATION HC HH HOSPICE MEDICATION

## 2019-07-27 PROCEDURE — 3336590001 HSPC ROOM AND BOARD

## 2019-07-27 PROCEDURE — 0651 HSPC ROUTINE HOME CARE

## 2019-07-28 PROCEDURE — 3336590001 HSPC ROOM AND BOARD

## 2019-07-28 PROCEDURE — 0651 HSPC ROUTINE HOME CARE

## 2019-07-29 ENCOUNTER — HOME CARE VISIT (OUTPATIENT)
Dept: SCHEDULING | Facility: HOME HEALTH | Age: 64
End: 2019-07-29
Payer: MEDICARE

## 2019-07-29 PROCEDURE — 0651 HSPC ROUTINE HOME CARE

## 2019-07-29 PROCEDURE — 3336590001 HSPC ROOM AND BOARD

## 2019-07-29 PROCEDURE — G0156 HHCP-SVS OF AIDE,EA 15 MIN: HCPCS

## 2019-07-30 ENCOUNTER — HOME CARE VISIT (OUTPATIENT)
Dept: SCHEDULING | Facility: HOME HEALTH | Age: 64
End: 2019-07-30
Payer: MEDICARE

## 2019-07-30 ENCOUNTER — HOME CARE VISIT (OUTPATIENT)
Dept: HOSPICE | Facility: HOSPICE | Age: 64
End: 2019-07-30
Payer: MEDICARE

## 2019-07-30 PROCEDURE — 0651 HSPC ROUTINE HOME CARE

## 2019-07-30 PROCEDURE — 3336590001 HSPC ROOM AND BOARD

## 2019-07-30 PROCEDURE — G0299 HHS/HOSPICE OF RN EA 15 MIN: HCPCS

## 2019-07-31 PROCEDURE — 0651 HSPC ROUTINE HOME CARE

## 2019-07-31 PROCEDURE — 3336590001 HSPC ROOM AND BOARD

## 2019-08-01 ENCOUNTER — HOME CARE VISIT (OUTPATIENT)
Dept: SCHEDULING | Facility: HOME HEALTH | Age: 64
End: 2019-08-01
Payer: MEDICARE

## 2019-08-01 PROCEDURE — G0156 HHCP-SVS OF AIDE,EA 15 MIN: HCPCS

## 2019-08-01 PROCEDURE — 3336590001 HSPC ROOM AND BOARD

## 2019-08-01 PROCEDURE — 3330220001 HC HSPC R&B RUG RATE

## 2019-08-01 PROCEDURE — A6250 SKIN SEAL PROTECT MOISTURIZR: HCPCS

## 2019-08-01 PROCEDURE — PD101 HC HSPC R&B RUG RATE

## 2019-08-01 PROCEDURE — 0651 HSPC ROUTINE HOME CARE

## 2019-08-01 PROCEDURE — T4522 ADULT SIZE BRIEF/DIAPER MED: HCPCS

## 2019-08-01 PROCEDURE — A9270 NON-COVERED ITEM OR SERVICE: HCPCS

## 2019-08-02 PROCEDURE — 0651 HSPC ROUTINE HOME CARE

## 2019-08-02 PROCEDURE — 3336590001 HSPC ROOM AND BOARD

## 2019-08-03 PROCEDURE — 0651 HSPC ROUTINE HOME CARE

## 2019-08-03 PROCEDURE — 3336590001 HSPC ROOM AND BOARD

## 2019-08-04 PROCEDURE — 3336590001 HSPC ROOM AND BOARD

## 2019-08-04 PROCEDURE — 0651 HSPC ROUTINE HOME CARE

## 2019-08-05 ENCOUNTER — HOME CARE VISIT (OUTPATIENT)
Dept: SCHEDULING | Facility: HOME HEALTH | Age: 64
End: 2019-08-05
Payer: MEDICARE

## 2019-08-05 PROCEDURE — 3336590001 HSPC ROOM AND BOARD

## 2019-08-05 PROCEDURE — 0651 HSPC ROUTINE HOME CARE

## 2019-08-05 PROCEDURE — G0156 HHCP-SVS OF AIDE,EA 15 MIN: HCPCS

## 2019-08-06 ENCOUNTER — HOME CARE VISIT (OUTPATIENT)
Dept: SCHEDULING | Facility: HOME HEALTH | Age: 64
End: 2019-08-06
Payer: MEDICARE

## 2019-08-06 PROCEDURE — G0155 HHCP-SVS OF CSW,EA 15 MIN: HCPCS

## 2019-08-06 PROCEDURE — 0651 HSPC ROUTINE HOME CARE

## 2019-08-06 PROCEDURE — 3336590001 HSPC ROOM AND BOARD

## 2019-08-06 PROCEDURE — G0299 HHS/HOSPICE OF RN EA 15 MIN: HCPCS

## 2019-08-07 PROCEDURE — 3336590001 HSPC ROOM AND BOARD

## 2019-08-07 PROCEDURE — 0651 HSPC ROUTINE HOME CARE

## 2019-08-08 ENCOUNTER — HOME CARE VISIT (OUTPATIENT)
Dept: SCHEDULING | Facility: HOME HEALTH | Age: 64
End: 2019-08-08
Payer: MEDICARE

## 2019-08-08 PROCEDURE — G0156 HHCP-SVS OF AIDE,EA 15 MIN: HCPCS

## 2019-08-08 PROCEDURE — 0651 HSPC ROUTINE HOME CARE

## 2019-08-08 PROCEDURE — 3336590001 HSPC ROOM AND BOARD

## 2019-08-09 PROCEDURE — 3336590001 HSPC ROOM AND BOARD

## 2019-08-09 PROCEDURE — HOSPICE MEDICATION HC HH HOSPICE MEDICATION

## 2019-08-09 PROCEDURE — 0651 HSPC ROUTINE HOME CARE

## 2019-08-10 PROCEDURE — 3336590001 HSPC ROOM AND BOARD

## 2019-08-10 PROCEDURE — 0651 HSPC ROUTINE HOME CARE

## 2019-08-11 PROCEDURE — HOSPICE MEDICATION HC HH HOSPICE MEDICATION

## 2019-08-11 PROCEDURE — 0651 HSPC ROUTINE HOME CARE

## 2019-08-11 PROCEDURE — 3336590001 HSPC ROOM AND BOARD

## 2019-08-12 ENCOUNTER — HOME CARE VISIT (OUTPATIENT)
Dept: SCHEDULING | Facility: HOME HEALTH | Age: 64
End: 2019-08-12
Payer: MEDICARE

## 2019-08-12 PROCEDURE — 0651 HSPC ROUTINE HOME CARE

## 2019-08-12 PROCEDURE — G0156 HHCP-SVS OF AIDE,EA 15 MIN: HCPCS

## 2019-08-12 PROCEDURE — 3336590001 HSPC ROOM AND BOARD

## 2019-08-13 PROCEDURE — 0651 HSPC ROUTINE HOME CARE

## 2019-08-13 PROCEDURE — 3336590001 HSPC ROOM AND BOARD

## 2019-08-14 PROCEDURE — 3336590001 HSPC ROOM AND BOARD

## 2019-08-14 PROCEDURE — 0651 HSPC ROUTINE HOME CARE

## 2019-08-15 ENCOUNTER — HOME CARE VISIT (OUTPATIENT)
Dept: SCHEDULING | Facility: HOME HEALTH | Age: 64
End: 2019-08-15
Payer: MEDICARE

## 2019-08-15 PROCEDURE — 3336590001 HSPC ROOM AND BOARD

## 2019-08-15 PROCEDURE — A6250 SKIN SEAL PROTECT MOISTURIZR: HCPCS

## 2019-08-15 PROCEDURE — 0651 HSPC ROUTINE HOME CARE

## 2019-08-15 PROCEDURE — T4522 ADULT SIZE BRIEF/DIAPER MED: HCPCS

## 2019-08-15 PROCEDURE — G0156 HHCP-SVS OF AIDE,EA 15 MIN: HCPCS

## 2019-08-15 PROCEDURE — A5120 SKIN BARRIER, WIPE OR SWAB: HCPCS

## 2019-08-15 PROCEDURE — A6213 FOAM DRG >16<=48 SQ IN W/BDR: HCPCS

## 2019-08-15 PROCEDURE — G0299 HHS/HOSPICE OF RN EA 15 MIN: HCPCS

## 2019-08-16 VITALS
SYSTOLIC BLOOD PRESSURE: 98 MMHG | RESPIRATION RATE: 18 BRPM | OXYGEN SATURATION: 98 % | DIASTOLIC BLOOD PRESSURE: 62 MMHG | HEART RATE: 58 BPM

## 2019-08-16 PROCEDURE — 0651 HSPC ROUTINE HOME CARE

## 2019-08-16 PROCEDURE — 3336590001 HSPC ROOM AND BOARD

## 2019-08-17 PROCEDURE — 3336590001 HSPC ROOM AND BOARD

## 2019-08-17 PROCEDURE — 0651 HSPC ROUTINE HOME CARE

## 2019-08-18 PROCEDURE — 0651 HSPC ROUTINE HOME CARE

## 2019-08-18 PROCEDURE — 3336590001 HSPC ROOM AND BOARD

## 2019-08-19 ENCOUNTER — HOME CARE VISIT (OUTPATIENT)
Dept: SCHEDULING | Facility: HOME HEALTH | Age: 64
End: 2019-08-19
Payer: MEDICARE

## 2019-08-19 PROCEDURE — 0651 HSPC ROUTINE HOME CARE

## 2019-08-19 PROCEDURE — 3336590001 HSPC ROOM AND BOARD

## 2019-08-19 PROCEDURE — G0156 HHCP-SVS OF AIDE,EA 15 MIN: HCPCS

## 2019-08-20 ENCOUNTER — HOME CARE VISIT (OUTPATIENT)
Dept: SCHEDULING | Facility: HOME HEALTH | Age: 64
End: 2019-08-20
Payer: MEDICARE

## 2019-08-20 PROCEDURE — HOSPICE MEDICATION HC HH HOSPICE MEDICATION

## 2019-08-20 PROCEDURE — 3336590001 HSPC ROOM AND BOARD

## 2019-08-20 PROCEDURE — 0651 HSPC ROUTINE HOME CARE

## 2019-08-20 PROCEDURE — G0299 HHS/HOSPICE OF RN EA 15 MIN: HCPCS

## 2019-08-21 PROCEDURE — 0651 HSPC ROUTINE HOME CARE

## 2019-08-21 PROCEDURE — 3336590001 HSPC ROOM AND BOARD

## 2019-08-22 ENCOUNTER — HOME CARE VISIT (OUTPATIENT)
Dept: SCHEDULING | Facility: HOME HEALTH | Age: 64
End: 2019-08-22
Payer: MEDICARE

## 2019-08-22 ENCOUNTER — HOME CARE VISIT (OUTPATIENT)
Dept: HOSPICE | Facility: HOSPICE | Age: 64
End: 2019-08-22
Payer: MEDICARE

## 2019-08-22 PROCEDURE — G0156 HHCP-SVS OF AIDE,EA 15 MIN: HCPCS

## 2019-08-22 PROCEDURE — 3336590001 HSPC ROOM AND BOARD

## 2019-08-22 PROCEDURE — 0651 HSPC ROUTINE HOME CARE

## 2019-08-23 PROCEDURE — 0651 HSPC ROUTINE HOME CARE

## 2019-08-23 PROCEDURE — 3336590001 HSPC ROOM AND BOARD

## 2019-08-24 PROCEDURE — 0651 HSPC ROUTINE HOME CARE

## 2019-08-24 PROCEDURE — 3336590001 HSPC ROOM AND BOARD

## 2019-08-25 PROCEDURE — 3336590001 HSPC ROOM AND BOARD

## 2019-08-25 PROCEDURE — 0651 HSPC ROUTINE HOME CARE

## 2019-08-26 ENCOUNTER — HOME CARE VISIT (OUTPATIENT)
Dept: SCHEDULING | Facility: HOME HEALTH | Age: 64
End: 2019-08-26
Payer: MEDICARE

## 2019-08-26 PROCEDURE — 3336590001 HSPC ROOM AND BOARD

## 2019-08-26 PROCEDURE — G0156 HHCP-SVS OF AIDE,EA 15 MIN: HCPCS

## 2019-08-26 PROCEDURE — 0651 HSPC ROUTINE HOME CARE

## 2019-08-27 ENCOUNTER — HOME CARE VISIT (OUTPATIENT)
Dept: SCHEDULING | Facility: HOME HEALTH | Age: 64
End: 2019-08-27
Payer: MEDICARE

## 2019-08-27 PROCEDURE — 0651 HSPC ROUTINE HOME CARE

## 2019-08-27 PROCEDURE — G0299 HHS/HOSPICE OF RN EA 15 MIN: HCPCS

## 2019-08-27 PROCEDURE — HOSPICE MEDICATION HC HH HOSPICE MEDICATION

## 2019-08-27 PROCEDURE — 3336590001 HSPC ROOM AND BOARD

## 2019-08-28 PROCEDURE — 3336590001 HSPC ROOM AND BOARD

## 2019-08-28 PROCEDURE — 0651 HSPC ROUTINE HOME CARE

## 2019-08-29 ENCOUNTER — HOME CARE VISIT (OUTPATIENT)
Dept: SCHEDULING | Facility: HOME HEALTH | Age: 64
End: 2019-08-29
Payer: MEDICARE

## 2019-08-29 PROCEDURE — 0651 HSPC ROUTINE HOME CARE

## 2019-08-29 PROCEDURE — 3336590001 HSPC ROOM AND BOARD

## 2019-08-30 PROCEDURE — 0651 HSPC ROUTINE HOME CARE

## 2019-08-30 PROCEDURE — 3336590001 HSPC ROOM AND BOARD

## 2019-08-31 PROCEDURE — 0651 HSPC ROUTINE HOME CARE

## 2019-08-31 PROCEDURE — 3336590001 HSPC ROOM AND BOARD

## 2019-09-01 PROCEDURE — 3330220001 HC HSPC R&B RUG RATE

## 2019-09-01 PROCEDURE — 0651 HSPC ROUTINE HOME CARE

## 2019-09-01 PROCEDURE — 3336590001 HSPC ROOM AND BOARD

## 2019-09-01 PROCEDURE — PD101 HC HSPC R&B RUG RATE

## 2019-09-02 PROCEDURE — 3336590001 HSPC ROOM AND BOARD

## 2019-09-02 PROCEDURE — 0651 HSPC ROUTINE HOME CARE

## 2020-01-01 ENCOUNTER — HOSPITAL ENCOUNTER (OUTPATIENT)
Dept: LAB | Age: 65
Discharge: HOME | End: 2020-06-09

## 2020-01-01 ENCOUNTER — HOME CARE VISIT (OUTPATIENT)
Dept: SCHEDULING | Facility: HOME HEALTH | Age: 65
End: 2020-01-01
Payer: MEDICARE

## 2020-01-01 ENCOUNTER — HOME CARE VISIT (OUTPATIENT)
Dept: HOSPICE | Facility: HOSPICE | Age: 65
End: 2020-01-01
Payer: MEDICARE

## 2020-01-01 ENCOUNTER — HOSPITAL ENCOUNTER (OUTPATIENT)
Dept: LAB | Age: 65
Discharge: HOME | End: 2020-05-02

## 2020-01-01 ENCOUNTER — HOSPITAL ENCOUNTER (OUTPATIENT)
Dept: LAB | Age: 65
Discharge: HOME | End: 2020-06-24

## 2020-01-01 ENCOUNTER — HOSPITAL ENCOUNTER (OUTPATIENT)
Dept: LAB | Age: 65
Discharge: HOME | End: 2020-04-27

## 2020-01-01 ENCOUNTER — HOSPITAL ENCOUNTER (OUTPATIENT)
Dept: LAB | Age: 65
Discharge: HOME | End: 2020-04-17

## 2020-01-01 ENCOUNTER — HOSPITAL ENCOUNTER (OUTPATIENT)
Dept: LAB | Age: 65
Discharge: HOME | End: 2020-08-27

## 2020-01-01 VITALS
HEART RATE: 71 BPM | OXYGEN SATURATION: 93 % | SYSTOLIC BLOOD PRESSURE: 57 MMHG | DIASTOLIC BLOOD PRESSURE: 43 MMHG | TEMPERATURE: 97.3 F

## 2020-01-01 VITALS
OXYGEN SATURATION: 97 % | HEART RATE: 88 BPM | HEART RATE: 88 BPM | DIASTOLIC BLOOD PRESSURE: 76 MMHG | SYSTOLIC BLOOD PRESSURE: 123 MMHG | DIASTOLIC BLOOD PRESSURE: 76 MMHG | TEMPERATURE: 97.6 F | OXYGEN SATURATION: 98 % | RESPIRATION RATE: 18 BRPM | TEMPERATURE: 98 F | RESPIRATION RATE: 18 BRPM | SYSTOLIC BLOOD PRESSURE: 110 MMHG

## 2020-01-01 VITALS
TEMPERATURE: 97.4 F | RESPIRATION RATE: 16 BRPM | DIASTOLIC BLOOD PRESSURE: 67 MMHG | HEART RATE: 88 BPM | OXYGEN SATURATION: 94 % | SYSTOLIC BLOOD PRESSURE: 108 MMHG

## 2020-01-01 VITALS
DIASTOLIC BLOOD PRESSURE: 56 MMHG | SYSTOLIC BLOOD PRESSURE: 119 MMHG | OXYGEN SATURATION: 97 % | TEMPERATURE: 97.8 F | RESPIRATION RATE: 16 BRPM | HEART RATE: 65 BPM

## 2020-01-01 VITALS
DIASTOLIC BLOOD PRESSURE: 52 MMHG | HEART RATE: 90 BPM | TEMPERATURE: 97.2 F | RESPIRATION RATE: 16 BRPM | OXYGEN SATURATION: 80 % | SYSTOLIC BLOOD PRESSURE: 100 MMHG

## 2020-01-01 VITALS
DIASTOLIC BLOOD PRESSURE: 62 MMHG | OXYGEN SATURATION: 90 % | TEMPERATURE: 98.2 F | RESPIRATION RATE: 16 BRPM | SYSTOLIC BLOOD PRESSURE: 100 MMHG | HEART RATE: 80 BPM

## 2020-01-01 VITALS
HEART RATE: 88 BPM | SYSTOLIC BLOOD PRESSURE: 109 MMHG | TEMPERATURE: 97 F | OXYGEN SATURATION: 98 % | RESPIRATION RATE: 18 BRPM | DIASTOLIC BLOOD PRESSURE: 75 MMHG

## 2020-01-01 VITALS
OXYGEN SATURATION: 93 % | SYSTOLIC BLOOD PRESSURE: 118 MMHG | RESPIRATION RATE: 16 BRPM | DIASTOLIC BLOOD PRESSURE: 68 MMHG | TEMPERATURE: 97.7 F | HEART RATE: 72 BPM

## 2020-01-01 VITALS
DIASTOLIC BLOOD PRESSURE: 62 MMHG | HEART RATE: 80 BPM | OXYGEN SATURATION: 98 % | SYSTOLIC BLOOD PRESSURE: 108 MMHG | RESPIRATION RATE: 16 BRPM | TEMPERATURE: 98.2 F

## 2020-01-01 VITALS
HEART RATE: 65 BPM | TEMPERATURE: 96.8 F | RESPIRATION RATE: 18 BRPM | DIASTOLIC BLOOD PRESSURE: 76 MMHG | OXYGEN SATURATION: 94 % | SYSTOLIC BLOOD PRESSURE: 111 MMHG

## 2020-01-01 VITALS
DIASTOLIC BLOOD PRESSURE: 72 MMHG | SYSTOLIC BLOOD PRESSURE: 109 MMHG | TEMPERATURE: 97 F | OXYGEN SATURATION: 89 % | RESPIRATION RATE: 18 BRPM | HEART RATE: 77 BPM

## 2020-01-01 VITALS
RESPIRATION RATE: 8 BRPM | SYSTOLIC BLOOD PRESSURE: 79 MMHG | HEART RATE: 47 BPM | OXYGEN SATURATION: 87 % | DIASTOLIC BLOOD PRESSURE: 48 MMHG

## 2020-01-01 VITALS
RESPIRATION RATE: 16 BRPM | SYSTOLIC BLOOD PRESSURE: 118 MMHG | RESPIRATION RATE: 16 BRPM | DIASTOLIC BLOOD PRESSURE: 67 MMHG | DIASTOLIC BLOOD PRESSURE: 58 MMHG | TEMPERATURE: 97.6 F | TEMPERATURE: 98 F | HEART RATE: 87 BPM | OXYGEN SATURATION: 95 % | HEART RATE: 67 BPM | OXYGEN SATURATION: 94 % | SYSTOLIC BLOOD PRESSURE: 120 MMHG

## 2020-01-01 VITALS
RESPIRATION RATE: 16 BRPM | SYSTOLIC BLOOD PRESSURE: 100 MMHG | OXYGEN SATURATION: 92 % | TEMPERATURE: 98.2 F | DIASTOLIC BLOOD PRESSURE: 62 MMHG | HEART RATE: 78 BPM

## 2020-01-01 VITALS
RESPIRATION RATE: 16 BRPM | DIASTOLIC BLOOD PRESSURE: 62 MMHG | TEMPERATURE: 97.2 F | SYSTOLIC BLOOD PRESSURE: 100 MMHG | HEART RATE: 88 BPM | OXYGEN SATURATION: 92 %

## 2020-01-01 VITALS
OXYGEN SATURATION: 93 % | HEART RATE: 67 BPM | RESPIRATION RATE: 16 BRPM | SYSTOLIC BLOOD PRESSURE: 109 MMHG | TEMPERATURE: 98.6 F | DIASTOLIC BLOOD PRESSURE: 74 MMHG

## 2020-01-01 VITALS — RESPIRATION RATE: 18 BRPM | HEART RATE: 77 BPM | OXYGEN SATURATION: 93 % | TEMPERATURE: 97.8 F

## 2020-01-01 VITALS
RESPIRATION RATE: 16 BRPM | HEART RATE: 56 BPM | OXYGEN SATURATION: 94 % | DIASTOLIC BLOOD PRESSURE: 72 MMHG | TEMPERATURE: 97.7 F | SYSTOLIC BLOOD PRESSURE: 120 MMHG

## 2020-01-01 VITALS
TEMPERATURE: 97.6 F | OXYGEN SATURATION: 94 % | DIASTOLIC BLOOD PRESSURE: 77 MMHG | SYSTOLIC BLOOD PRESSURE: 108 MMHG | HEART RATE: 65 BPM | RESPIRATION RATE: 16 BRPM

## 2020-01-01 VITALS
HEART RATE: 90 BPM | RESPIRATION RATE: 16 BRPM | DIASTOLIC BLOOD PRESSURE: 42 MMHG | TEMPERATURE: 98.2 F | OXYGEN SATURATION: 94 % | SYSTOLIC BLOOD PRESSURE: 106 MMHG

## 2020-01-01 VITALS — TEMPERATURE: 98 F | HEART RATE: 76 BPM | RESPIRATION RATE: 18 BRPM | OXYGEN SATURATION: 95 %

## 2020-01-01 VITALS
RESPIRATION RATE: 18 BRPM | TEMPERATURE: 97.6 F | DIASTOLIC BLOOD PRESSURE: 76 MMHG | SYSTOLIC BLOOD PRESSURE: 118 MMHG | HEART RATE: 88 BPM | OXYGEN SATURATION: 93 %

## 2020-01-01 VITALS
TEMPERATURE: 97.3 F | SYSTOLIC BLOOD PRESSURE: 109 MMHG | DIASTOLIC BLOOD PRESSURE: 67 MMHG | HEART RATE: 88 BPM | RESPIRATION RATE: 16 BRPM | OXYGEN SATURATION: 95 %

## 2020-01-01 VITALS
SYSTOLIC BLOOD PRESSURE: 118 MMHG | DIASTOLIC BLOOD PRESSURE: 72 MMHG | TEMPERATURE: 96.8 F | OXYGEN SATURATION: 95 % | RESPIRATION RATE: 18 BRPM | HEART RATE: 56 BPM

## 2020-01-01 VITALS
DIASTOLIC BLOOD PRESSURE: 77 MMHG | SYSTOLIC BLOOD PRESSURE: 128 MMHG | HEART RATE: 76 BPM | RESPIRATION RATE: 18 BRPM | TEMPERATURE: 98 F | OXYGEN SATURATION: 96 %

## 2020-01-01 VITALS
DIASTOLIC BLOOD PRESSURE: 52 MMHG | RESPIRATION RATE: 14 BRPM | SYSTOLIC BLOOD PRESSURE: 96 MMHG | OXYGEN SATURATION: 94 % | HEART RATE: 80 BPM | TEMPERATURE: 97.2 F

## 2020-01-01 VITALS
DIASTOLIC BLOOD PRESSURE: 62 MMHG | HEART RATE: 78 BPM | OXYGEN SATURATION: 92 % | RESPIRATION RATE: 16 BRPM | TEMPERATURE: 98.2 F | SYSTOLIC BLOOD PRESSURE: 110 MMHG

## 2020-01-01 VITALS
SYSTOLIC BLOOD PRESSURE: 104 MMHG | RESPIRATION RATE: 18 BRPM | DIASTOLIC BLOOD PRESSURE: 84 MMHG | OXYGEN SATURATION: 96 % | HEART RATE: 62 BPM | TEMPERATURE: 97.1 F

## 2020-01-01 VITALS
RESPIRATION RATE: 16 BRPM | TEMPERATURE: 98.2 F | DIASTOLIC BLOOD PRESSURE: 62 MMHG | SYSTOLIC BLOOD PRESSURE: 110 MMHG | OXYGEN SATURATION: 80 % | HEART RATE: 90 BPM

## 2020-01-01 VITALS — TEMPERATURE: 97 F | RESPIRATION RATE: 16 BRPM | HEART RATE: 88 BPM | OXYGEN SATURATION: 96 %

## 2020-01-01 VITALS
SYSTOLIC BLOOD PRESSURE: 110 MMHG | HEART RATE: 80 BPM | DIASTOLIC BLOOD PRESSURE: 62 MMHG | OXYGEN SATURATION: 93 % | TEMPERATURE: 98.2 F | RESPIRATION RATE: 12 BRPM

## 2020-01-01 VITALS — RESPIRATION RATE: 14 BRPM | HEART RATE: 114 BPM | OXYGEN SATURATION: 93 %

## 2020-01-01 VITALS
RESPIRATION RATE: 16 BRPM | SYSTOLIC BLOOD PRESSURE: 122 MMHG | HEART RATE: 66 BPM | DIASTOLIC BLOOD PRESSURE: 76 MMHG | OXYGEN SATURATION: 93 %

## 2020-01-01 LAB
COVID-19, XGCOVT: NEGATIVE
SARS-COV-2, COV2NT: DETECTED
SARS-COV-2, COV2NT: NOT DETECTED
SOURCE, COVRS: NORMAL

## 2020-01-01 PROCEDURE — 0651 HSPC ROUTINE HOME CARE

## 2020-01-01 PROCEDURE — 3336590001 HSPC ROOM AND BOARD

## 2020-01-01 PROCEDURE — A6250 SKIN SEAL PROTECT MOISTURIZR: HCPCS

## 2020-01-01 PROCEDURE — 3331090004 HSPC SERVICE INTENSITY ADD-ON

## 2020-01-01 PROCEDURE — PD102 HC HSPC R&B RUG RATE

## 2020-01-01 PROCEDURE — T4541 LARGE DISPOSABLE UNDERPAD: HCPCS

## 2020-01-01 PROCEDURE — G0299 HHS/HOSPICE OF RN EA 15 MIN: HCPCS

## 2020-01-01 PROCEDURE — T4522 ADULT SIZE BRIEF/DIAPER MED: HCPCS

## 2020-01-01 PROCEDURE — HOSPICE MEDICATION HC HH HOSPICE MEDICATION

## 2020-01-01 PROCEDURE — PC102 HC HSPC R&B RUG RATE

## 2020-01-01 PROCEDURE — T4523 ADULT SIZE BRIEF/DIAPER LG: HCPCS

## 2020-01-01 PROCEDURE — G0155 HHCP-SVS OF CSW,EA 15 MIN: HCPCS

## 2020-01-01 PROCEDURE — G0156 HHCP-SVS OF AIDE,EA 15 MIN: HCPCS

## 2020-01-01 PROCEDURE — 3330220001 HC HSPC R&B RUG RATE

## 2020-01-01 PROCEDURE — K0669 SEAT/BACK CUS NO DMEPDAC VER: HCPCS

## 2020-01-01 PROCEDURE — HHS10554 SHAMPOO/BODY WASH 8 OZ ALOE VESTA

## 2020-01-01 PROCEDURE — 87635 SARS-COV-2 COVID-19 AMP PRB: CPT

## 2020-01-01 PROCEDURE — ES103 HC HSPC R&B RUG RATE

## 2020-01-01 PROCEDURE — HHS10773

## 2020-01-01 PROCEDURE — HHS10335 MOUTHWASH  FLAVOR ALCOHOL FREE 4 OZ

## 2020-03-24 PROBLEM — Z51.5 HOSPICE CARE: Status: ACTIVE | Noted: 2020-01-01

## (undated) DEVICE — CONTAINER PREFIL FRMLN 15ML --

## (undated) DEVICE — SYR 50ML SLIP TIP NSAF LF STRL --

## (undated) DEVICE — KENDALL 500 SERIES DIAPHORETIC FOAM MONITORING ELECTRODE - TEAR DROP SHAPE ( 30/PK): Brand: KENDALL

## (undated) DEVICE — FLEX ADVANTAGE 1500CC: Brand: FLEX ADVANTAGE

## (undated) DEVICE — Device

## (undated) DEVICE — BASIN EMESIS 500CC ROSE 250/CS 60/PLT: Brand: MEDEGEN MEDICAL PRODUCTS, LLC

## (undated) DEVICE — ESOPHAGEAL WIREGUIDED BALLOON DILATATION CATHETER: Brand: CRE WIREGUIDED

## (undated) DEVICE — FORCEPS BX L240CM JAW DIA2.8MM L CAP W/ NDL MIC MESH TOOTH

## (undated) DEVICE — BITE BLK ENDOSCP AD 54FR GRN POLYETH ENDOSCP W STRP SLD

## (undated) DEVICE — KIT COLON W/ 1.1OZ LUB AND 2 END

## (undated) DEVICE — MEDI-VAC NON-CONDUCTIVE SUCTION TUBING: Brand: CARDINAL HEALTH